# Patient Record
Sex: FEMALE | Race: WHITE | NOT HISPANIC OR LATINO | Employment: FULL TIME
[De-identification: names, ages, dates, MRNs, and addresses within clinical notes are randomized per-mention and may not be internally consistent; named-entity substitution may affect disease eponyms.]

---

## 2017-07-22 ENCOUNTER — HEALTH MAINTENANCE LETTER (OUTPATIENT)
Age: 42
End: 2017-07-22

## 2017-10-06 ENCOUNTER — HOSPITAL ENCOUNTER (OUTPATIENT)
Dept: MAMMOGRAPHY | Facility: CLINIC | Age: 42
Discharge: HOME | End: 2017-10-06
Attending: INTERNAL MEDICINE | Admitting: INTERNAL MEDICINE
Payer: COMMERCIAL

## 2017-10-06 DIAGNOSIS — Z12.31 VISIT FOR SCREENING MAMMOGRAM: ICD-10-CM

## 2017-10-06 PROCEDURE — G0202 SCR MAMMO BI INCL CAD: HCPCS

## 2018-01-29 ENCOUNTER — OFFICE VISIT (OUTPATIENT)
Dept: SURGERY | Facility: CLINIC | Age: 43
End: 2018-01-29
Payer: COMMERCIAL

## 2018-01-29 VITALS
WEIGHT: 180 LBS | OXYGEN SATURATION: 98 % | HEART RATE: 71 BPM | RESPIRATION RATE: 16 BRPM | HEIGHT: 70 IN | BODY MASS INDEX: 25.77 KG/M2 | DIASTOLIC BLOOD PRESSURE: 62 MMHG | SYSTOLIC BLOOD PRESSURE: 118 MMHG

## 2018-01-29 DIAGNOSIS — K42.9 UMBILICAL HERNIA WITHOUT OBSTRUCTION AND WITHOUT GANGRENE: Primary | ICD-10-CM

## 2018-01-29 PROCEDURE — 99203 OFFICE O/P NEW LOW 30 MIN: CPT | Performed by: SURGERY

## 2018-01-29 ASSESSMENT — ENCOUNTER SYMPTOMS
VOMITING: 1
DIARRHEA: 1

## 2018-01-29 NOTE — MR AVS SNAPSHOT
After Visit Summary   1/29/2018    Laney Hall    MRN: [id number]           Patient Information     Date Of Birth          1975        Visit Information        Provider Department      1/29/2018 9:15 AM Trent Perez MD [facility] Hernia      Today's Diagnoses     Umbilical hernia without obstruction and without gangrene    -  1       Follow-ups after your visit        Your next 10 appointments already scheduled     Feb 09, 2018   Procedure with Trent Perez MD   [facility] (--)    [address]  [facility] [address]   [phone]            Feb 09, 2018 10:45 AM [facility] Same Day Surgery with Trent Perez MD, Morelia Edgar PA-C   [facility] ([facility])    [facility] ([facility])   [phone]              Who to contact     If you have questions or need follow up information about today's clinic visit or your schedule please contact [facility] [place] directly at [phone].  Normal or non-critical lab and imaging results will be communicated to you by [employer], letter or phone within 4 business days after the clinic has received the results. If you do not hear from us within 7 days, please contact the clinic through [employer] or phone. If you have a critical or abnormal lab result, we will notify you by phone as soon as possible.  Submit refill requests through [employer] or call your pharmacy and they will forward the refill request to us. Please allow 3 business days for your refill to be completed.          Additional Information About Your Visit        [employer]igoVision Information     [employer] gives you secure access to your electronic health record. If you see a primary care provider, you can also send messages to your care team and make appointments. If you have  "questions, please call your primary care clinic.  If you do not have a primary care provider, please call [phone] and they will assist you.        Care EveryWhere ID     This is your Care EveryWhere ID. This could be used by other organizations to access your [facility] medical records  [id number]        Your Vitals Were     Pulse Respirations Height Pulse Oximetry BMI (Body Mass Index)       71 16 5' 10\" (1.778 m) 98% 25.83 kg/m2        Blood Pressure from Last 3 Encounters:   01/29/18 118/62   01/09/16 122/76   07/15/13 96/70    Weight from Last 3 Encounters:   01/29/18 180 lb (81.6 kg)   01/07/16 205 lb (93 kg)   07/15/13 185 lb 12.8 oz (84.3 kg)              Today, you had the following     No orders found for display       Primary Care Provider Office Phone # Fax #    Orin Cabrera [phone]       [address] [facility] [address]        Equal Access to Services     [facility]: Hadii aad ku hadasho Soomaali, waaxda luqadaha, qaybta kaalmada adeegyada, waxzack oliver . So [facility] [phone].    ATENCIÓN: Si habla español, tiene a arnold disposición servicios gratuitos de asistencia lingüística. Llame al [phone].    We comply with applicable federal civil rights laws and [place] laws. We do not discriminate on the basis of race, color, national origin, age, disability, sex, sexual orientation, or gender identity.            Thank you!     Thank you for choosing [facility]  for your care. Our goal is always to provide you with excellent care. Hearing back from our patients is one way we can continue to improve our services. Please take a few minutes to complete the written survey that you may receive in the mail after your visit with us. Thank you!             Your Updated Medication List - Protect others around you: Learn how to safely use, store and throw away your medicines at www.disposemymeds.org.          This " list is accurate as of 1/29/18  9:59 AM.  Always use your most recent med list.                   Brand Name Dispense Instructions for use Diagnosis    PRENATAL VITAMINS PO      Take 1 tablet by mouth daily.

## 2018-01-29 NOTE — PROGRESS NOTES
HPI      ROS (Review of Systems):     GASTROINTESTINAL: Positive for vomiting and diarrhea.          Physical Exam

## 2018-01-29 NOTE — LETTER
"2018    Re: Laney Hall - 1975    Laney is a 42 year old female who presents for hernia evaluation.  The patient has noticed a bulge.  Pain and a vague discomfort has been present.  Symptoms began 2 years ago after the birth of her youngest child. More recently she has had bloating, nausea and vomiting and diarrhea every couple of weeks which she has attributed to the hernia.  The patient has had previous abdominal surgery including a laparoscopic procedure to change the course of her right ureter.  Patient does not report that increased activity/lifting causes pain. 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     Constipation No  Dysuria No  Cough No  Smoking No   Pt's chart has been reviewed for PMH, PSH, allergies, medications, social and family history.     ROS:  Pulm:  No shortness of breath, dyspnea on exertion, cough, or hemoptysis  CV:  negative  ABD:  See chief complaint  :  Negative  All other systems on 10 point review are negative.   /62  Pulse 71  Resp 16  Ht 5' 10\" (1.778 m)  Wt 180 lb (81.6 kg)  SpO2 98%  BMI 25.83 kg/m2  Physical exam:  Patient able to get up on table without difficulty.  abdomen is soft without significant tenderness, masses, organomegaly or guarding  bowel sounds are positive and no caput medusa noted.  There is visible eversion to the upper portion of her umbilical skin which is a partly reducible and fat containing hernia, it augments with cough and Valsalva. There are three small and well healed laparoscopic port sites, one of which is a little superior to the umbilicus.     Imaging  None     Assesment: umbilical hernia.     Plan: The nature of hernias, anatomic considerations, indications and approaches to repair were discussed.  Risks of operative intervention were discussed including infection, bleeding, harm to structures as well as recurrence, which is about 5% per decade of life.  Post operative recuperation and activity " limitations were reviewed as well. The patient is interested in pursuing repair and we will assist in scheduling this with her. I relayed that some of her intestinal symptoms might not improve after surgery as they do not seem physiologically related.  If they do persist, I would have her see a gastroenterologist.     Trent Gaytan MD

## 2018-01-29 NOTE — PROGRESS NOTES
"Laney is a 42 year old female who presents for hernia evaluation.  The patient has noticed a bulge.  Pain and a vague discomfort has been present.  Symptoms began 2 years ago after the birth of her youngest child. More recently she has had bloating, nausea and vomiting and diarrhea every couple of weeks which she has attributed to the hernia.  The patient has had previous abdominal surgery including a laparoscopic procedure to change the course of her right ureter.  Patient does not report that increased activity/lifting causes pain. 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    Constipation No  Dysuria No  Cough No  Smoking No    Pt's chart has been reviewed for PMH, PSH, allergies, medications, social and family history.    ROS:  Pulm:  No shortness of breath, dyspnea on exertion, cough, or hemoptysis  CV:  negative  ABD:  See chief complaint  :  Negative  All other systems on 10 point review are negative.    /62  Pulse 71  Resp 16  Ht 5' 10\" (1.778 m)  Wt 180 lb (81.6 kg)  SpO2 98%  BMI 25.83 kg/m2  Physical exam:  Patient able to get up on table without difficulty.  abdomen is soft without significant tenderness, masses, organomegaly or guarding  bowel sounds are positive and no caput medusa noted.  There is visible eversion to the upper portion of her umbilical skin which is a partly reducible and fat containing hernia, it augments with cough and Valsalva. There are three small and well healed laparoscopic port sites, one of which is a little superior to the umbilicus.    Imaging  None    Assesment: umbilical hernia.    Plan: The nature of hernias, anatomic considerations, indications and approaches to repair were discussed.  Risks of operative intervention were discussed including infection, bleeding, harm to structures as well as recurrence, which is about 5% per decade of life.  Post operative recuperation and activity limitations were reviewed as well.  The patient is interested " in pursuing repair and we will assist in scheduling this with her. I relayed that some of her intestinal symptoms might not improve after surgery as they do not seem physiologically related.  If they do persist, I would have her see a gastroenterologist.    Trent Gaytan MD  1/29/2018 9:36 AM    Please route or send letter to:  Primary Care Provider (PCP)

## 2018-02-08 ENCOUNTER — OFFICE VISIT (OUTPATIENT)
Dept: INTERNAL MEDICINE | Facility: CLINIC | Age: 43
End: 2018-02-08
Payer: COMMERCIAL

## 2018-02-08 VITALS
BODY MASS INDEX: 27.67 KG/M2 | SYSTOLIC BLOOD PRESSURE: 100 MMHG | WEIGHT: 186.8 LBS | DIASTOLIC BLOOD PRESSURE: 60 MMHG | TEMPERATURE: 97.8 F | HEART RATE: 72 BPM | HEIGHT: 69 IN | OXYGEN SATURATION: 98 %

## 2018-02-08 DIAGNOSIS — K42.9 UMBILICAL HERNIA WITHOUT OBSTRUCTION AND WITHOUT GANGRENE: ICD-10-CM

## 2018-02-08 DIAGNOSIS — T78.40XS ALLERGIC REACTION, SEQUELA: ICD-10-CM

## 2018-02-08 DIAGNOSIS — Z01.818 PREOP GENERAL PHYSICAL EXAM: Primary | ICD-10-CM

## 2018-02-08 LAB
ALBUMIN SERPL-MCNC: 3.6 G/DL (ref 3.4–5)
ALP SERPL-CCNC: 61 U/L (ref 40–150)
ALT SERPL W P-5'-P-CCNC: 22 U/L (ref 0–50)
ANION GAP SERPL CALCULATED.3IONS-SCNC: 3 MMOL/L (ref 3–14)
AST SERPL W P-5'-P-CCNC: 13 U/L (ref 0–45)
BILIRUB SERPL-MCNC: 0.2 MG/DL (ref 0.2–1.3)
BUN SERPL-MCNC: 13 MG/DL (ref 7–30)
CALCIUM SERPL-MCNC: 8.5 MG/DL (ref 8.5–10.1)
CHLORIDE SERPL-SCNC: 108 MMOL/L (ref 94–109)
CO2 SERPL-SCNC: 30 MMOL/L (ref 20–32)
CREAT SERPL-MCNC: 0.86 MG/DL (ref 0.52–1.04)
ERYTHROCYTE [DISTWIDTH] IN BLOOD BY AUTOMATED COUNT: 13.1 % (ref 10–15)
GFR SERPL CREATININE-BSD FRML MDRD: 73 ML/MIN/1.7M2
GLUCOSE SERPL-MCNC: 83 MG/DL (ref 70–99)
HCT VFR BLD AUTO: 41.9 % (ref 35–47)
HGB BLD-MCNC: 13.8 G/DL (ref 11.7–15.7)
MCH RBC QN AUTO: 30.2 PG (ref 26.5–33)
MCHC RBC AUTO-ENTMCNC: 32.9 G/DL (ref 31.5–36.5)
MCV RBC AUTO: 92 FL (ref 78–100)
PLATELET # BLD AUTO: 230 10E9/L (ref 150–450)
POTASSIUM SERPL-SCNC: 3.7 MMOL/L (ref 3.4–5.3)
PROT SERPL-MCNC: 6.9 G/DL (ref 6.8–8.8)
RBC # BLD AUTO: 4.57 10E12/L (ref 3.8–5.2)
SODIUM SERPL-SCNC: 141 MMOL/L (ref 133–144)
WBC # BLD AUTO: 9.5 10E9/L (ref 4–11)

## 2018-02-08 PROCEDURE — 80053 COMPREHEN METABOLIC PANEL: CPT | Performed by: INTERNAL MEDICINE

## 2018-02-08 PROCEDURE — 36415 COLL VENOUS BLD VENIPUNCTURE: CPT | Performed by: INTERNAL MEDICINE

## 2018-02-08 PROCEDURE — 85027 COMPLETE CBC AUTOMATED: CPT | Performed by: INTERNAL MEDICINE

## 2018-02-08 PROCEDURE — 99215 OFFICE O/P EST HI 40 MIN: CPT | Performed by: INTERNAL MEDICINE

## 2018-02-08 NOTE — NURSING NOTE
"Chief Complaint   Patient presents with     Pre-Op Exam      Hernia Surgery       Initial /60 (BP Location: Right arm, Patient Position: Sitting, Cuff Size: Adult Regular)  Pulse 72  Temp 97.8  F (36.6  C) (Oral)  Ht 5' 9\" (1.753 m)  Wt 186 lb 12.8 oz (84.7 kg)  LMP 01/15/2018 (Exact Date)  SpO2 98%  Breastfeeding? No  BMI 27.59 kg/m2 Estimated body mass index is 27.59 kg/(m^2) as calculated from the following:    Height as of this encounter: 5' 9\" (1.753 m).    Weight as of this encounter: 186 lb 12.8 oz (84.7 kg).  Medication Reconciliation: complete   IA SMA    "

## 2018-02-08 NOTE — H&P (VIEW-ONLY)
[place] [address]  [facility] [address]  [phone]  Dept: [phone]    PRE-OP EVALUATION:  Today's date: 2018    Laney Hall (: 1975) presents for pre-operative evaluation assessment as requested by Dr. Trent Hathaway. She requires evaluation and anesthesia risk assessment prior to undergoing surgery/procedure for treatment of  Hernia.  Proposed procedure: Surgery    Date of Surgery/ Procedure 2018  Time of Surgery/ Procedure: 10:55am  Hospital/Surgical Facility: [facility]  Primary Physician: Orin Cabrera  Type of Anesthesia Anticipated: to be determined    Patient has a Health Care Directive or Living Will:  NO    Preop Questions 2018   1.  Do you have a history of heart attack, stroke, stent, bypass or surgery on an artery in the head, neck, heart or legs? No   2.  Do you ever have any pain or discomfort in your chest? No   3.  Do you have a history of  Heart Failure? No   4.   Are you troubled by shortness of breath when:  walking on a level surface, or up a slight hill, or at night? No   5.  Do you currently have a cold, bronchitis or other respiratory infection? No   6.  Do you have a cough, shortness of breath, or wheezing? No   7.  Do you sometimes get pains in the calves of your legs when you walk? No   8. Do you or anyone in your family have previous history of blood clots? No   9.  Do you or does anyone in your family have a serious bleeding problem such as prolonged bleeding following surgeries or cuts? No   10. Have you ever had problems with anemia or been told to take iron pills? No   11. Have you had any abnormal blood loss such as black, tarry or bloody stools, or abnormal vaginal bleeding? No   12. Have you ever had a blood transfusion? No   13. Have you or any of your relatives ever had problems with anesthesia? No   14. Do you have sleep apnea, excessive snoring or daytime drowsiness? No   15. Do you have any  "prosthetic heart valves? No   16. Do you have prosthetic joints? No   17. Is there any chance that you may be pregnant? No       CC:  Preop for multiple medical problems.    HPI:    The patient is scheduled for umbilical hernia surgery with Dr. Carter  on  Feb 9, 2018  No other acute complaints.    Assessment:  1. V72.83H Preop general physical exam _ I do not see any major contraindications for the patient to go through the scheduled surgery.    The proposed surgical procedure is considered  INTERMEDIATE,   surgery risk.    For above listed surgery and anesthesia, Patient is at  LOW  risk for surgery/procedure and perioperative/procedure complications.      (T78.40XS) Allergic reaction, to anesthesia med in 2005   ( surgery at [facility])  Comment: with hives   Plan: she thinks she might have the name at home. I advised her to come earlier, so the anesthesiologist will have time to contact [facility]     (K42.9) Umbilical hernia without obstruction and without gangrene  Comment:   Plan: surgery, as above        Cardiovascular risk  Assessment  -- low  --  No further cardiac work up is needed before this surgery.         Pulmonary Risk Assessment -- low  --  The patient doesn't have chronic lung disease nor acute respiratory problems         Anemia Assessment :  --  no anemia - patient doesn't need further anemia work up      Blood Sugar Assessment  --  patient doesn't have DM      Anticoagulation assessment  --  patient does not take anticoagulation meds        Plan:  1. In the morning of the surgery day you do not take any meds  2. Try to be at the surgical center at 7-7:30 to discuss with the anesthesiologist about the allergic reaction to the prior anesthesia medication  3. Labs today     Physical exam:    Blood pressure 100/60, pulse 72, temperature 97.8  F (36.6  C), temperature source Oral, height 5' 9\" (1.753 m), weight 186 lb 12.8 oz (84.7 kg), last menstrual period 01/15/2018, SpO2 98 %, not currently " breastfeeding.   NAD, appears comfortable  Skin clear, no rashes  HEENT: PERRLA, EOMI, anicteric sclera, pink conjunctiva, external ears appear normal, bilateral tympanic membranes clinically normal, oropharynx normal color.  Neck: supple, no JVD,  no thyroidmegaly  Lymph nodes non palpable in the cervical, supraclavicular   Chest: clear to auscultation with good respiratory effort  Cardiac: S1S2, RRR, no mgr appreciated  Abdomen: soft, not tender, not distended, audible bowel sound, no hepatosplenomegaly, no palpable masses, no abdominal bruits. Umbilical hernia   Extremities: no cyanosis, clubbing or edema.   Neuro: A, Ox3, no focal signs.        ROS:   as above     Patient Active Problem List   Diagnosis     SROM (spontaneous rupture of membranes)     Indication for care/intervention related to labor/delivery, antepartum     Normal delivery     Indication for care in labor or delivery     Delivery normal        Past Medical History:   Diagnosis Date     Renal disease     stent in Ureter 05 for torsion      Past Surgical History:   Procedure Laterality Date     AS ENLARGE BREAST WITH IMPLANT  2003     C NONSPECIFIC PROCEDURE  2003    breast augmentation     GENITOURINARY SURGERY      ureteral stent 05        PSHx: No complications with prior surgeries  She had allergic reaction to anesthesia med 2005    Soc Hx: No daily alcohol, no smoking     Family History   Problem Relation Age of Onset     Hypertension Maternal Grandmother      Arthritis Maternal Grandmother      RA     Cancer - colorectal Maternal Grandfather         All: reviewed    Meds: reviewed  Current Outpatient Prescriptions   Medication Sig Dispense Refill     PRENATAL VITAMINS PO Take 1 tablet by mouth daily.              Orin Bauer MD  Internal Medicine     MEDICAL HISTORY:                                                      Patient Active Problem List    Diagnosis Date Noted     Indication for care in labor or delivery 01/07/2016     Priority:  Medium     Delivery normal 01/07/2016     Priority: Medium     Normal delivery 05/03/2013     Priority: Medium     SROM (spontaneous rupture of membranes) 05/02/2013     Priority: Medium     Indication for care/intervention related to labor/delivery, antepartum 05/02/2013     Priority: Medium      Past Medical History:   Diagnosis Date     Renal disease     stent in Ureter 05 for torsion     Past Surgical History:   Procedure Laterality Date     AS ENLARGE BREAST WITH IMPLANT  2003     C NONSPECIFIC PROCEDURE  2003    breast augmentation     GENITOURINARY SURGERY      ureteral stent 05     Current Outpatient Prescriptions   Medication Sig Dispense Refill     PRENATAL VITAMINS PO Take 1 tablet by mouth daily.       OTC products: None, except as noted above    Allergies   Allergen Reactions     No Known Drug Allergies       Latex Allergy: NO    Social History   Substance Use Topics     Smoking status: Never Smoker     Smokeless tobacco: Never Used      Comment: NEVER SMOKES ON REGULAR BASIS     Alcohol use No      Comment: RARELY     History   Drug Use No         Recent Labs   Lab Test  01/08/16   0658  07/15/13   1549   HGB  13.2  14.5   PLT   --   202   NA   --   143   POTASSIUM   --   3.7   CR   --   0.81

## 2018-02-08 NOTE — PROGRESS NOTES
[place] [address]  [facility] [address]  [phone]  Dept: [phone]    PRE-OP EVALUATION:  Today's date: 2018    Laney Hall (: 1975) presents for pre-operative evaluation assessment as requested by Dr. Trent Hathaway. She requires evaluation and anesthesia risk assessment prior to undergoing surgery/procedure for treatment of  Hernia.  Proposed procedure: Surgery    Date of Surgery/ Procedure 2018  Time of Surgery/ Procedure: 10:55am  Hospital/Surgical Facility: [facility]  Primary Physician: Orin Cabrera  Type of Anesthesia Anticipated: to be determined    Patient has a Health Care Directive or Living Will:  NO    Preop Questions 2018   1.  Do you have a history of heart attack, stroke, stent, bypass or surgery on an artery in the head, neck, heart or legs? No   2.  Do you ever have any pain or discomfort in your chest? No   3.  Do you have a history of  Heart Failure? No   4.   Are you troubled by shortness of breath when:  walking on a level surface, or up a slight hill, or at night? No   5.  Do you currently have a cold, bronchitis or other respiratory infection? No   6.  Do you have a cough, shortness of breath, or wheezing? No   7.  Do you sometimes get pains in the calves of your legs when you walk? No   8. Do you or anyone in your family have previous history of blood clots? No   9.  Do you or does anyone in your family have a serious bleeding problem such as prolonged bleeding following surgeries or cuts? No   10. Have you ever had problems with anemia or been told to take iron pills? No   11. Have you had any abnormal blood loss such as black, tarry or bloody stools, or abnormal vaginal bleeding? No   12. Have you ever had a blood transfusion? No   13. Have you or any of your relatives ever had problems with anesthesia? No   14. Do you have sleep apnea, excessive snoring or daytime drowsiness? No   15. Do you have any  "prosthetic heart valves? No   16. Do you have prosthetic joints? No   17. Is there any chance that you may be pregnant? No       CC:  Preop for multiple medical problems.    HPI:    The patient is scheduled for umbilical hernia surgery with Dr. Carter  on  Feb 9, 2018  No other acute complaints.    Assessment:  1. V72.83H Preop general physical exam _ I do not see any major contraindications for the patient to go through the scheduled surgery.    The proposed surgical procedure is considered  INTERMEDIATE,   surgery risk.    For above listed surgery and anesthesia, Patient is at  LOW  risk for surgery/procedure and perioperative/procedure complications.      (T78.40XS) Allergic reaction, to anesthesia med in 2005   ( surgery at [facility])  Comment: with hives   Plan: she thinks she might have the name at home. I advised her to come earlier, so the anesthesiologist will have time to contact [facility]     (K42.9) Umbilical hernia without obstruction and without gangrene  Comment:   Plan: surgery, as above        Cardiovascular risk  Assessment  -- low  --  No further cardiac work up is needed before this surgery.         Pulmonary Risk Assessment -- low  --  The patient doesn't have chronic lung disease nor acute respiratory problems         Anemia Assessment :  --  no anemia - patient doesn't need further anemia work up      Blood Sugar Assessment  --  patient doesn't have DM      Anticoagulation assessment  --  patient does not take anticoagulation meds        Plan:  1. In the morning of the surgery day you do not take any meds  2. Try to be at the surgical center at 7-7:30 to discuss with the anesthesiologist about the allergic reaction to the prior anesthesia medication  3. Labs today     Physical exam:    Blood pressure 100/60, pulse 72, temperature 97.8  F (36.6  C), temperature source Oral, height 5' 9\" (1.753 m), weight 186 lb 12.8 oz (84.7 kg), last menstrual period 01/15/2018, SpO2 98 %, not currently " breastfeeding.   NAD, appears comfortable  Skin clear, no rashes  HEENT: PERRLA, EOMI, anicteric sclera, pink conjunctiva, external ears appear normal, bilateral tympanic membranes clinically normal, oropharynx normal color.  Neck: supple, no JVD,  no thyroidmegaly  Lymph nodes non palpable in the cervical, supraclavicular   Chest: clear to auscultation with good respiratory effort  Cardiac: S1S2, RRR, no mgr appreciated  Abdomen: soft, not tender, not distended, audible bowel sound, no hepatosplenomegaly, no palpable masses, no abdominal bruits. Umbilical hernia   Extremities: no cyanosis, clubbing or edema.   Neuro: A, Ox3, no focal signs.        ROS:   as above     Patient Active Problem List   Diagnosis     SROM (spontaneous rupture of membranes)     Indication for care/intervention related to labor/delivery, antepartum     Normal delivery     Indication for care in labor or delivery     Delivery normal        Past Medical History:   Diagnosis Date     Renal disease     stent in Ureter 05 for torsion      Past Surgical History:   Procedure Laterality Date     AS ENLARGE BREAST WITH IMPLANT  2003     C NONSPECIFIC PROCEDURE  2003    breast augmentation     GENITOURINARY SURGERY      ureteral stent 05        PSHx: No complications with prior surgeries  She had allergic reaction to anesthesia med 2005    Soc Hx: No daily alcohol, no smoking     Family History   Problem Relation Age of Onset     Hypertension Maternal Grandmother      Arthritis Maternal Grandmother      RA     Cancer - colorectal Maternal Grandfather         All: reviewed    Meds: reviewed  Current Outpatient Prescriptions   Medication Sig Dispense Refill     PRENATAL VITAMINS PO Take 1 tablet by mouth daily.              Orin Bauer MD  Internal Medicine     MEDICAL HISTORY:                                                      Patient Active Problem List    Diagnosis Date Noted     Indication for care in labor or delivery 01/07/2016     Priority:  Medium     Delivery normal 01/07/2016     Priority: Medium     Normal delivery 05/03/2013     Priority: Medium     SROM (spontaneous rupture of membranes) 05/02/2013     Priority: Medium     Indication for care/intervention related to labor/delivery, antepartum 05/02/2013     Priority: Medium      Past Medical History:   Diagnosis Date     Renal disease     stent in Ureter 05 for torsion     Past Surgical History:   Procedure Laterality Date     AS ENLARGE BREAST WITH IMPLANT  2003     C NONSPECIFIC PROCEDURE  2003    breast augmentation     GENITOURINARY SURGERY      ureteral stent 05     Current Outpatient Prescriptions   Medication Sig Dispense Refill     PRENATAL VITAMINS PO Take 1 tablet by mouth daily.       OTC products: None, except as noted above    Allergies   Allergen Reactions     No Known Drug Allergies       Latex Allergy: NO    Social History   Substance Use Topics     Smoking status: Never Smoker     Smokeless tobacco: Never Used      Comment: NEVER SMOKES ON REGULAR BASIS     Alcohol use No      Comment: RARELY     History   Drug Use No         Recent Labs   Lab Test  01/08/16   0658  07/15/13   1549   HGB  13.2  14.5   PLT   --   202   NA   --   143   POTASSIUM   --   3.7   CR   --   0.81

## 2018-02-08 NOTE — PATIENT INSTRUCTIONS
Plan:  1. In the morning of the surgery day you do not take any meds  2. Try to be at the surgical center at 7-7:30 to discuss with the anesthesiologist about the allergic reaction to the prior anesthesia medication  3. Labs today

## 2018-02-08 NOTE — MR AVS SNAPSHOT
After Visit Summary   2/8/2018    Laney Hall    MRN: [id number]           Patient Information     Date Of Birth          1975        Visit Information        Provider Department      2/8/2018 3:20 PM Orin Cabrera MD [facility]        Today's Diagnoses     Preop general physical exam    -  1    Allergic reaction, sequela        Umbilical hernia without obstruction and without gangrene          Care Instructions      Plan:  1. In the morning of the surgery day you do not take any meds  2. Try to be at the surgical center at 7-7:30 to discuss with the anesthesiologist about the allergic reaction to the prior anesthesia medication  3. Labs today           Follow-ups after your visit        Your next 10 appointments already scheduled     Feb 09, 2018   Procedure with Trent Perez MD   [facility] [place] [facility] (--)    [address] [facility] [address]   [phone]            Feb 09, 2018 10:45 AM [facility] Same Day Surgery with Trent Perez MD, Morelia Edgar PA-C   [facility] ([facility]    [facility])   [phone]              Who to contact     If you have questions or need follow up information about today's clinic visit or your schedule please contact [facility] directly at [phone].  Normal or non-critical lab and imaging results will be communicated to you by MyChart, letter or phone within 4 business days after the clinic has received the results. If you do not hear from us within 7 days, please contact the clinic through MyChart or phone. If you have a critical or abnormal lab result, we will notify you by phone as soon as possible.  Submit refill requests through [employer] or call your pharmacy and they will forward the refill request to us. Please allow 3 business days for  "your refill to be completed.          Additional Information About Your Visit        MyChart Information     [employer] gives you secure access to your electronic health record. If you see a primary care provider, you can also send messages to your care team and make appointments. If you have questions, please call your primary care clinic.  If you do not have a primary care provider, please call [phone] and they will assist you.        Care EveryWhere ID     This is your Care EveryWhere ID. This could be used by other organizations to access your [facility] medical records  [id number]        Your Vitals Were     Pulse Temperature Height Last Period Pulse Oximetry Breastfeeding?    72 97.8  F (36.6  C) (Oral) 5' 9\" (1.753 m) 01/15/2018 (Exact Date) 98% No    BMI (Body Mass Index)                   27.59 kg/m2            Blood Pressure from Last 3 Encounters:   02/08/18 100/60   01/29/18 118/62   01/09/16 122/76    Weight from Last 3 Encounters:   02/08/18 186 lb 12.8 oz (84.7 kg)   01/29/18 180 lb (81.6 kg)   02/05/18 180 lb (81.6 kg)              We Performed the Following     CBC with platelets     Comprehensive metabolic panel        Primary Care Provider Office Phone # Fax #    Orin Chasity Cabrera [phone]       [address] [facility] [address]        Equal Access to Services     [facility]: Hadii aad ku hadasho Soomaali, waaxda luqadaha, qaybta kaalmada adeegyada, brooks castillo hayyuri oliver . So [facility] [phone].    ATENCIÓN: Si habla español, tiene a arnold disposición servicios gratuitos de asistencia lingüística. Llame al [phone].    We comply with applicable federal civil rights laws and [place] laws. We do not discriminate on the basis of race, color, national origin, age, disability, sex, sexual orientation, or gender identity.            Thank you!     Thank you for choosing [facility]  for your care. Our goal is always to " provide you with excellent care. Hearing back from our patients is one way we can continue to improve our services. Please take a few minutes to complete the written survey that you may receive in the mail after your visit with us. Thank you!             Your Updated Medication List - Protect others around you: Learn how to safely use, store and throw away your medicines at www.disposemymeds.org.          This list is accurate as of 2/8/18  4:04 PM.  Always use your most recent med list.                   Brand Name Dispense Instructions for use Diagnosis    PRENATAL VITAMINS PO      Take 1 tablet by mouth daily.

## 2018-02-09 ENCOUNTER — HOSPITAL ENCOUNTER (OUTPATIENT)
Facility: CLINIC | Age: 43
Discharge: HOME | End: 2018-02-09
Attending: SURGERY | Admitting: SURGERY
Payer: COMMERCIAL

## 2018-02-09 ENCOUNTER — ANESTHESIA EVENT (OUTPATIENT)
Dept: SURGERY | Facility: CLINIC | Age: 43
End: 2018-02-09
Payer: COMMERCIAL

## 2018-02-09 ENCOUNTER — ANESTHESIA (OUTPATIENT)
Dept: SURGERY | Facility: CLINIC | Age: 43
End: 2018-02-09
Payer: COMMERCIAL

## 2018-02-09 ENCOUNTER — SURGERY (OUTPATIENT)
Age: 43
End: 2018-02-09

## 2018-02-09 ENCOUNTER — APPOINTMENT (OUTPATIENT)
Dept: SURGERY | Facility: PHYSICIAN GROUP | Age: 43
End: 2018-02-09
Payer: COMMERCIAL

## 2018-02-09 VITALS
SYSTOLIC BLOOD PRESSURE: 122 MMHG | BODY MASS INDEX: 27.55 KG/M2 | WEIGHT: 186 LBS | TEMPERATURE: 97.8 F | RESPIRATION RATE: 18 BRPM | DIASTOLIC BLOOD PRESSURE: 87 MMHG | HEART RATE: 68 BPM | OXYGEN SATURATION: 99 % | HEIGHT: 69 IN

## 2018-02-09 DIAGNOSIS — K42.9 UMBILICAL HERNIA WITHOUT OBSTRUCTION AND WITHOUT GANGRENE: Primary | ICD-10-CM

## 2018-02-09 LAB — HCG UR QL: NEGATIVE

## 2018-02-09 PROCEDURE — 25000128 H RX IP 250 OP 636: Performed by: ANESTHESIOLOGY

## 2018-02-09 PROCEDURE — 25000128 H RX IP 250 OP 636: Performed by: SURGERY

## 2018-02-09 PROCEDURE — C1781 MESH (IMPLANTABLE): HCPCS | Performed by: SURGERY

## 2018-02-09 PROCEDURE — 25000566 ZZH SEVOFLURANE, EA 15 MIN: Performed by: SURGERY

## 2018-02-09 PROCEDURE — 25000132 ZZH RX MED GY IP 250 OP 250 PS 637: Performed by: SURGERY

## 2018-02-09 PROCEDURE — 49585 ZZHC REPAIR UMBILICAL HERN,5+Y/O,REDUC: CPT | Performed by: SURGERY

## 2018-02-09 PROCEDURE — 49585 ZZHC REPAIR UMBILICAL HERN,5+Y/O,REDUC: CPT | Mod: AS | Performed by: PHYSICIAN ASSISTANT

## 2018-02-09 PROCEDURE — 27210794 ZZH OR GENERAL SUPPLY STERILE: Performed by: SURGERY

## 2018-02-09 PROCEDURE — 25000128 H RX IP 250 OP 636: Performed by: NURSE ANESTHETIST, CERTIFIED REGISTERED

## 2018-02-09 PROCEDURE — 36000052 ZZH SURGERY LEVEL 2 EA 15 ADDTL MIN: Performed by: SURGERY

## 2018-02-09 PROCEDURE — 71000027 ZZH RECOVERY PHASE 2 EACH 15 MINS: Performed by: SURGERY

## 2018-02-09 PROCEDURE — 40000305 ZZH STATISTIC PRE PROC ASSESS I: Performed by: SURGERY

## 2018-02-09 PROCEDURE — 37000009 ZZH ANESTHESIA TECHNICAL FEE, EACH ADDTL 15 MIN: Performed by: SURGERY

## 2018-02-09 PROCEDURE — 36000050 ZZH SURGERY LEVEL 2 1ST 30 MIN: Performed by: SURGERY

## 2018-02-09 PROCEDURE — 25000125 ZZHC RX 250: Performed by: SURGERY

## 2018-02-09 PROCEDURE — 37000008 ZZH ANESTHESIA TECHNICAL FEE, 1ST 30 MIN: Performed by: SURGERY

## 2018-02-09 PROCEDURE — 81025 URINE PREGNANCY TEST: CPT | Performed by: ANESTHESIOLOGY

## 2018-02-09 PROCEDURE — 71000012 ZZH RECOVERY PHASE 1 LEVEL 1 FIRST HR: Performed by: SURGERY

## 2018-02-09 PROCEDURE — 25000125 ZZHC RX 250: Performed by: NURSE ANESTHETIST, CERTIFIED REGISTERED

## 2018-02-09 DEVICE — MESH VENTRALEX HERNIA 2.5" CIRCLE MED W/STRAP 5950008: Type: IMPLANTABLE DEVICE | Site: UMBILICAL | Status: FUNCTIONAL

## 2018-02-09 RX ORDER — FENTANYL CITRATE 50 UG/ML
INJECTION, SOLUTION INTRAMUSCULAR; INTRAVENOUS PRN
Status: DISCONTINUED | OUTPATIENT
Start: 2018-02-09 | End: 2018-02-09

## 2018-02-09 RX ORDER — GLYCINE 1.5 G/100ML
SOLUTION IRRIGATION PRN
Status: DISCONTINUED | OUTPATIENT
Start: 2018-02-09 | End: 2018-02-09 | Stop reason: HOSPADM

## 2018-02-09 RX ORDER — PROPOFOL 10 MG/ML
INJECTION, EMULSION INTRAVENOUS PRN
Status: DISCONTINUED | OUTPATIENT
Start: 2018-02-09 | End: 2018-02-09

## 2018-02-09 RX ORDER — FENTANYL CITRATE 50 UG/ML
25-50 INJECTION, SOLUTION INTRAMUSCULAR; INTRAVENOUS
Status: DISCONTINUED | OUTPATIENT
Start: 2018-02-09 | End: 2018-02-09 | Stop reason: HOSPADM

## 2018-02-09 RX ORDER — GLYCOPYRROLATE 0.2 MG/ML
INJECTION, SOLUTION INTRAMUSCULAR; INTRAVENOUS PRN
Status: DISCONTINUED | OUTPATIENT
Start: 2018-02-09 | End: 2018-02-09

## 2018-02-09 RX ORDER — BUPIVACAINE HYDROCHLORIDE 5 MG/ML
INJECTION, SOLUTION EPIDURAL; INTRACAUDAL PRN
Status: DISCONTINUED | OUTPATIENT
Start: 2018-02-09 | End: 2018-02-09 | Stop reason: HOSPADM

## 2018-02-09 RX ORDER — ONDANSETRON 4 MG/1
4 TABLET, ORALLY DISINTEGRATING ORAL EVERY 30 MIN PRN
Status: DISCONTINUED | OUTPATIENT
Start: 2018-02-09 | End: 2018-02-09 | Stop reason: HOSPADM

## 2018-02-09 RX ORDER — CEFAZOLIN SODIUM 1 G/3ML
1 INJECTION, POWDER, FOR SOLUTION INTRAMUSCULAR; INTRAVENOUS SEE ADMIN INSTRUCTIONS
Status: DISCONTINUED | OUTPATIENT
Start: 2018-02-09 | End: 2018-02-09 | Stop reason: HOSPADM

## 2018-02-09 RX ORDER — HYDROCODONE BITARTRATE AND ACETAMINOPHEN 5; 325 MG/1; MG/1
1-2 TABLET ORAL EVERY 4 HOURS PRN
Qty: 15 TABLET | Refills: 0 | Status: SHIPPED | OUTPATIENT
Start: 2018-02-09 | End: 2019-04-12

## 2018-02-09 RX ORDER — CEFAZOLIN SODIUM 2 G/100ML
2 INJECTION, SOLUTION INTRAVENOUS
Status: COMPLETED | OUTPATIENT
Start: 2018-02-09 | End: 2018-02-09

## 2018-02-09 RX ORDER — HYDROMORPHONE HYDROCHLORIDE 1 MG/ML
.3-.5 INJECTION, SOLUTION INTRAMUSCULAR; INTRAVENOUS; SUBCUTANEOUS EVERY 10 MIN PRN
Status: DISCONTINUED | OUTPATIENT
Start: 2018-02-09 | End: 2018-02-09 | Stop reason: HOSPADM

## 2018-02-09 RX ORDER — MEPERIDINE HYDROCHLORIDE 25 MG/ML
12.5 INJECTION INTRAMUSCULAR; INTRAVENOUS; SUBCUTANEOUS
Status: DISCONTINUED | OUTPATIENT
Start: 2018-02-09 | End: 2018-02-09 | Stop reason: HOSPADM

## 2018-02-09 RX ORDER — LIDOCAINE 40 MG/G
CREAM TOPICAL
Status: DISCONTINUED | OUTPATIENT
Start: 2018-02-09 | End: 2018-02-09 | Stop reason: HOSPADM

## 2018-02-09 RX ORDER — LABETALOL HYDROCHLORIDE 5 MG/ML
10 INJECTION, SOLUTION INTRAVENOUS
Status: DISCONTINUED | OUTPATIENT
Start: 2018-02-09 | End: 2018-02-09 | Stop reason: HOSPADM

## 2018-02-09 RX ORDER — HYDROCODONE BITARTRATE AND ACETAMINOPHEN 5; 325 MG/1; MG/1
1 TABLET ORAL
Status: COMPLETED | OUTPATIENT
Start: 2018-02-09 | End: 2018-02-09

## 2018-02-09 RX ORDER — ONDANSETRON 2 MG/ML
4 INJECTION INTRAMUSCULAR; INTRAVENOUS EVERY 30 MIN PRN
Status: DISCONTINUED | OUTPATIENT
Start: 2018-02-09 | End: 2018-02-09 | Stop reason: HOSPADM

## 2018-02-09 RX ORDER — ONDANSETRON 2 MG/ML
INJECTION INTRAMUSCULAR; INTRAVENOUS PRN
Status: DISCONTINUED | OUTPATIENT
Start: 2018-02-09 | End: 2018-02-09

## 2018-02-09 RX ORDER — DEXAMETHASONE SODIUM PHOSPHATE 4 MG/ML
INJECTION, SOLUTION INTRA-ARTICULAR; INTRALESIONAL; INTRAMUSCULAR; INTRAVENOUS; SOFT TISSUE PRN
Status: DISCONTINUED | OUTPATIENT
Start: 2018-02-09 | End: 2018-02-09

## 2018-02-09 RX ORDER — SODIUM CHLORIDE, SODIUM LACTATE, POTASSIUM CHLORIDE, CALCIUM CHLORIDE 600; 310; 30; 20 MG/100ML; MG/100ML; MG/100ML; MG/100ML
INJECTION, SOLUTION INTRAVENOUS CONTINUOUS
Status: DISCONTINUED | OUTPATIENT
Start: 2018-02-09 | End: 2018-02-09 | Stop reason: HOSPADM

## 2018-02-09 RX ORDER — LIDOCAINE HYDROCHLORIDE 10 MG/ML
INJECTION, SOLUTION INFILTRATION; PERINEURAL PRN
Status: DISCONTINUED | OUTPATIENT
Start: 2018-02-09 | End: 2018-02-09

## 2018-02-09 RX ORDER — NALOXONE HYDROCHLORIDE 0.4 MG/ML
.1-.4 INJECTION, SOLUTION INTRAMUSCULAR; INTRAVENOUS; SUBCUTANEOUS
Status: DISCONTINUED | OUTPATIENT
Start: 2018-02-09 | End: 2018-02-09 | Stop reason: HOSPADM

## 2018-02-09 RX ADMIN — CEFAZOLIN SODIUM 2 G: 2 INJECTION, SOLUTION INTRAVENOUS at 11:49

## 2018-02-09 RX ADMIN — SODIUM CHLORIDE, POTASSIUM CHLORIDE, SODIUM LACTATE AND CALCIUM CHLORIDE: 600; 310; 30; 20 INJECTION, SOLUTION INTRAVENOUS at 10:50

## 2018-02-09 RX ADMIN — GLYCOPYRROLATE 0.2 MG: 0.2 INJECTION, SOLUTION INTRAMUSCULAR; INTRAVENOUS at 11:49

## 2018-02-09 RX ADMIN — GLYCINE 50 ML: 1.5 SOLUTION IRRIGATION at 12:15

## 2018-02-09 RX ADMIN — FENTANYL CITRATE 50 MCG: 50 INJECTION, SOLUTION INTRAMUSCULAR; INTRAVENOUS at 12:25

## 2018-02-09 RX ADMIN — FENTANYL CITRATE 100 MCG: 50 INJECTION, SOLUTION INTRAMUSCULAR; INTRAVENOUS at 11:43

## 2018-02-09 RX ADMIN — DEXAMETHASONE SODIUM PHOSPHATE 4 MG: 4 INJECTION, SOLUTION INTRA-ARTICULAR; INTRALESIONAL; INTRAMUSCULAR; INTRAVENOUS; SOFT TISSUE at 11:43

## 2018-02-09 RX ADMIN — FENTANYL CITRATE 50 MCG: 50 INJECTION, SOLUTION INTRAMUSCULAR; INTRAVENOUS at 12:01

## 2018-02-09 RX ADMIN — ONDANSETRON 4 MG: 2 INJECTION INTRAMUSCULAR; INTRAVENOUS at 12:00

## 2018-02-09 RX ADMIN — HYDROCODONE BITARTRATE AND ACETAMINOPHEN 1 TABLET: 5; 325 TABLET ORAL at 12:55

## 2018-02-09 RX ADMIN — MIDAZOLAM 2 MG: 1 INJECTION INTRAMUSCULAR; INTRAVENOUS at 11:37

## 2018-02-09 RX ADMIN — PROPOFOL 200 MG: 10 INJECTION, EMULSION INTRAVENOUS at 11:43

## 2018-02-09 RX ADMIN — BUPIVACAINE HYDROCHLORIDE 30 ML: 5 INJECTION, SOLUTION EPIDURAL; INTRACAUDAL at 12:14

## 2018-02-09 RX ADMIN — FENTANYL CITRATE 50 MCG: 50 INJECTION INTRAMUSCULAR; INTRAVENOUS at 12:49

## 2018-02-09 RX ADMIN — LIDOCAINE HYDROCHLORIDE 30 MG: 10 INJECTION, SOLUTION INFILTRATION; PERINEURAL at 11:43

## 2018-02-09 NOTE — ANESTHESIA POSTPROCEDURE EVALUATION
Patient: Laney Hall    Procedure(s):  Open repair umbilical hernia with mesh - Wound Class: I-Clean    Diagnosis:Umbilical hernia  Diagnosis Additional Information: Pre-operative diagnosis: Umbilical hernia  Post-operative diagnosis Umbilical hernia  Procedure: Procedure(s):  Open repair umbilical hernia with mesh - Wound Class: I-Clean        Anesthesia Type:  No value filed.    Note:  Anesthesia Post Evaluation    Patient location during evaluation: PACU  Patient participation: Able to fully participate in evaluation  Level of consciousness: awake and alert  Pain management: adequate  Airway patency: patent  Cardiovascular status: acceptable  Respiratory status: acceptable  Hydration status: acceptable  PONV: none     Anesthetic complications: None          Last vitals:  Vitals:    02/09/18 1300 02/09/18 1321 02/09/18 1349   BP: 122/74 121/85 122/87   Pulse:      Resp: 17 18 18   Temp: 97.5  F (36.4  C) 98.2  F (36.8  C) 97.8  F (36.6  C)   SpO2: 100% 100% 99%         Electronically Signed By: Juan A Contreras MD  February 9, 2018  4:17 PM

## 2018-02-09 NOTE — DISCHARGE INSTRUCTIONS
"HOME CARE FOLLOWING UMBILICAL/VENTRAL HERNIA REPAIR  BO Freeman, MARIAN Shell, JASBIR Hathaway    DIET:  No restrictions.  Increased fluid intake is recommended. While taking pain medications, increase dietary fiber or add a fiber supplementation like Metamucil or Citrucel to help prevent constipation - a possible side effect of pain medications.    NAUSEA:  If nauseated from the anesthetic/pain meds; rest in bed, get up cautiously with assistance, and drink clear liquids (juice, tea, broth).    ACTIVITY:  Light Activity -- you may immediately be up and about as tolerated.  Driving -- you may drive when comfortable and off narcotic pain medications.  Light Work -- resume when comfortable off pain medications.  (If you can drive, you probably can work.)  Strenuous Work/Activity -- limit lifting to 20 pounds for 3 weeks.  Active Sports (running, biking, etc.) -- cautiously resume after 4 weeks.    INCISIONAL CARE:    If you have a dressing in place, keep clean and dry for 48 hours after surgery.  After this timeframe, you may replace the gauze daily if it becomes soiled.    You may remove the dressing and shower 48 hours after surgery.  Do not submerse incision in water for 1 week.    If you have a Dermabond dressing (a type of skin glue), you may shower immediately.    Sutures will absorb and need not be removed.    If present, leave the steri-strips (white paper tapes) in place for 14 days after surgery.    If present, leave Dermabond glue in place until it wears/flakes off.    Expect a variable amount of swelling/bruising/discoloration that may appear around or below the repair site.    Some numbness around the incision is common.    A lump/\"healing ridge\" under the incision is normal and will gradually resolve over the following 1-2 months.    DISCOMFORT:  Local anesthetic placed at surgery should provide relief for 4-8 hours.  Begin taking pain pills before discomfort is severe.  Take the " pain medication with some food, when possible, to minimize side effects.  Intermittent use of ice packs to the hernia repair site may help during the first 1-3 weeks after surgery.  Expect gradual improvement.    Over-the-counter anti-inflammatory medications (i.e. Ibuprofen/Advil/Motrin or Naprosyn/Aleve) may be used per package instructions in addition to or while tapering off the narcotic pain medications to decrease swelling and sensitivity at the repair site.  DO NOT TAKE these Anti-inflammatory medications if your primary physician has advised against doing so, or if you have acid reflux, ulcer, or bleeding disorder, or take blood-thinner medications.  Call your primary physician or the surgery office if you have medication questions.      RETURN APPOINTMENT:  Schedule a follow-up visit 2-3 weeks post-op.  Office Phone:  [phone]     CONTACT US IF THE FOLLOWING DEVELOPS:   1. A fever that is above 101     2. If there is a large amount of drainage, bleeding, or swelling.   3. Severe pain that is not relieved by your prescription.   4. Drainage that is thick, cloudy, yellow, green or white.   5. Any other questions not answered by  Frequently Asked Questions  sheet.      FREQUENTLY ASKED QUESTIONS:    Q:  How should my incision look?    A:  Normally your incision will appear slightly swollen with light redness directly along the incision itself as it heals.  It may feel like a bump or ridge as the healing/scarring happens, and over time (3-4 months) this bump or ridge feeling should slowly go away.  In general, clear or pink watery drainage can be normal at first as your incision heals, but should decrease over time.    Q:  How do I know if my incision is infected?  A:  Look at your incision for signs of infection, like redness around the incision spreading to surrounding skin, or drainage of cloudy or foul-smelling drainage.  If you feel warm, check your temperature to see if you are running a fever.     **If any of these things occur, please notify the nurse at our office.  We may need you to come into the office for an incision check.      Q:  How do I take care of my incision?  A:  If you have a dressing in place - Starting the day after surgery, replace the dressing 1-2 times a day until there is no further drainage from the incision.  At that time, a dressing is no longer needed.  Try to minimize tape on the skin if irritation is occurring at the tape sites.  If you have significant irritation from tape on the skin, please call the office to discuss other method of dressing your incision.    Small pieces of tape called  steri-strips  may be present directly overlying your incision; these may be removed 10 days after surgery unless otherwise specified by your surgeon.  If these tapes start to loosen at the ends, you may trim them back until they fall off or are removed.    A:  If you had  Dermabond  tissue glue used as a dressing (this causes your incision to look shiny with a clear covering over it) - This type of dressing wears off with time and does not require more dressings over the top unless it is draining around the glue as it wears off.  Do not apply ointments or lotions over the incisions until the glue has completely worn off.    Q:  There is a piece of tape or a sticky  lead  still on my skin.  Can I remove this?  A:  Sometimes the sticky  leads  used for monitoring during surgery or for evaluation in the emergency department are not all removed while you are in the hospital.  These sometimes have a tab or metal dot on them.  You can easily remove these on your own, like taking off a band-aid.  If there is a gel substance under the  lead , simply wipe/clean it off with a washcloth or paper towel.      Q:  What can I do to minimize constipation (very hard stools, or lack of stools)?  A:  Stay well hydrated.  Increase your dietary fiber intake or take a fiber supplement -with plenty of water.  Walk  around frequently.  You may consider an over-the-counter stool-softener.  Your Pharmacist can assist you with choosing one that is stocked at your pharmacy.  Constipation is also one of the most common side effects of pain medication.  If you are using pain medication, be pro-active and try to PREVENT problems with constipation by taking the steps above BEFORE constipation becomes a problem.    Q:  What do I do if I need more pain medications?  A:  Call the office to receive refills.  Be aware that certain pain meds cannot be called into a pharmacy and actually require a paper prescription.  A change may be made in your pain med as you progress thru your recovery period or if you have side effects to certain meds.    --Pain meds are NOT refilled after 5pm on weekdays, and NOT AT ALL on the weekends, so please look ahead to prevent problems.      Q:  Why am I having a hard time sleeping now that I am at home?  A:  Many medications you receive while you are in the hospital can impact your sleep for a number of days after your surgery/hospitalization.  Decreased level of activity and naps during the day may also make sleeping at night difficult.  Try to minimize day-time naps, and get up frequently during the day to walk around your home during your recovery time.  Sleep aides may be of some help, but are not recommended for long-term use.      Q:  I am having some back discomfort.  What should I do?  A:  This may be related to certain positioning that was required for your surgery, extended periods of time in bed, or other changes in your overall activity level.  You may try ice, heat, acetaminophen, or ibuprofen to treat this temporarily.  Note that many pain medications have acetaminophen in them and would state this on the prescription bottle.  Be sure not to exceed the maximum of 4000mg per day of acetaminophen.     **If the pain you are having does not resolve, is severe, or is a flare of back pain you have had  on other occasions prior to surgery, please contact your primary physician for further recommendations or for an appointment to be examined at their office.    Q:  Why am I having headaches?  A:  Headaches can be caused by many things:  caffeine withdrawal, use of pain meds, dehydration, high blood pressure, lack of sleep, over-activity/exhaustion, flare-up of usual migraine headaches.  If you feel this is related to muscle tension (a band-like feeling around the head, or a pressure at the low-back of the head) you may try ice or heat to this area.  You may need to drink more fluids (try electrolyte drink like Gatorade), rest, or take your usual migraine medications.   **If your headaches do not resolve, worsen, are accompanied by other symptoms, or if your blood pressure is high, please call your primary physician for recommendation and/or examination.    Q:  I am unable to urinate.  What do I do?  A:  A small percentage of people can have difficulty urinating initially after surgery.  This includes being able to urinate only a very small amount at a time and feeling discomfort or pressure in the very low abdomen.  This is called  urinary retention , and is actually an urgent situation.  Proceed to your nearest Emergency department for evaluation (not an Urgent Care Center).  Sometimes the bladder does not work correctly after certain medications you receive during surgery, or related to certain procedures.  You may need to have a catheter placed until your bladder recovers.  When planning to go to an Emergency department, it may help to call the ER to let them know you are coming in for this problem after a surgery.  This may help you get in quicker to be evaluated.  **If you have symptoms of a urinary tract infection, please contact your primary physician for the proper evaluation and treatment.          If you have other questions, please call the office Monday thru Friday between 8am and 5pm to discuss with the  nurse or physician assistant.  #[phone]    There is a surgeon ON CALL on weekday evenings and over the weekend in case of urgent need only, and may be contacted at the same number.    If you are having an emergency, call 911 or proceed to your nearest emergency department.    GENERAL ANESTHESIA OR SEDATION ADULT DISCHARGE INSTRUCTIONS   SPECIAL PRECAUTIONS FOR 24 HOURS AFTER SURGERY    IT IS NOT UNUSUAL TO FEEL LIGHT-HEADED OR FAINT, UP TO 24 HOURS AFTER SURGERY OR WHILE TAKING PAIN MEDICATION.  IF YOU HAVE THESE SYMPTOMS; SIT FOR A FEW MINUTES BEFORE STANDING AND HAVE SOMEONE ASSIST YOU WHEN YOU GET UP TO WALK OR USE THE BATHROOM.    YOU SHOULD REST AND RELAX FOR THE NEXT 24 HOURS AND YOU MUST MAKE ARRANGEMENTS TO HAVE SOMEONE STAY WITH YOU FOR AT LEAST 24 HOURS AFTER YOUR DISCHARGE.  AVOID HAZARDOUS AND STRENUOUS ACTIVITIES.  DO NOT MAKE IMPORTANT DECISIONS FOR 24 HOURS.    DO NOT DRIVE ANY VEHICLE OR OPERATE MECHANICAL EQUIPMENT FOR 24 HOURS FOLLOWING THE END OF YOUR SURGERY.  EVEN THOUGH YOU MAY FEEL NORMAL, YOUR REACTIONS MAY BE AFFECTED BY THE MEDICATION YOU HAVE RECEIVED.    DO NOT DRINK ALCOHOLIC BEVERAGES FOR 24 HOURS FOLLOWING YOUR SURGERY.    DRINK CLEAR LIQUIDS (APPLE JUICE, GINGER ALE, 7-UP, BROTH, ETC.).  PROGRESS TO YOUR REGULAR DIET AS YOU FEEL ABLE.    YOU MAY HAVE A DRY MOUTH, A SORE THROAT, MUSCLES ACHES OR TROUBLE SLEEPING.  THESE SHOULD GO AWAY AFTER 24 HOURS.    CALL YOUR DOCTOR FOR ANY OF THE FOLLOWING:  SIGNS OF INFECTION (FEVER, GROWING TENDERNESS AT THE SURGERY SITE, A LARGE AMOUNT OF DRAINAGE OR BLEEDING, SEVERE PAIN, FOUL-SMELLING DRAINAGE, REDNESS OR SWELLING.    IT HAS BEEN OVER 8 TO 10 HOURS SINCE SURGERY AND YOU ARE STILL NOT ABLE TO URINATE (PASS WATER)    You had 1 NORCO tablet at 1:00 pm.

## 2018-02-09 NOTE — ANESTHESIA CARE TRANSFER NOTE
Patient: Laney Hall    Procedure(s):  Open repair umbilical hernia with mesh - Wound Class: I-Clean    Diagnosis: Umbilical hernia  Diagnosis Additional Information: No value filed.    Anesthesia Type:   No value filed.     Note:  Airway :Face Mask  Patient transferred to:PACU  Comments: Report and sign off to RN in PACU.  Good spontaneous resps with LMA and O2. Skin pink, vital signs stable.Handoff Report: Identifed the Patient, Identified the Reponsible Provider, Reviewed the pertinent medical history, Discussed the surgical course, Reviewed Intra-OP anesthesia mangement and issues during anesthesia, Set expectations for post-procedure period and Allowed opportunity for questions and acknowledgement of understanding      Vitals: (Last set prior to Anesthesia Care Transfer)    CRNA VITALS  2/9/2018 1200 - 2/9/2018 1237      2/9/2018             Pulse: 50    SpO2: 100 %    Resp Rate (observed): (!)  6                Electronically Signed By: FENG Velazquez CRNA  February 9, 2018  12:37 PM

## 2018-02-09 NOTE — IP AVS SNAPSHOT
MRN:[id number]                      After Visit Summary   2/9/2018    Laney Hall    MRN: [id number]           Thank you!     Thank you for choosing [facility] for your care. Our goal is always to provide you with excellent care. Hearing back from our patients is one way we can continue to improve our services. Please take a few minutes to complete the written survey that you may receive in the mail after you visit. If you would like to speak to someone directly about your visit please contact Patient Relations at [phone]. Thank you!          Patient Information     Date Of Birth          1975        About your hospital stay     You were admitted on:  February 9, 2018 You last received care in the:  [facility]    You were discharged on:  February 9, 2018       Who to Call     For medical emergencies, please call 911.  For non-urgent questions about your medical care, please call your primary care provider or clinic, [phone]  For questions related to your surgery, please call your surgery clinic        Attending Provider     Provider Specialty    Trent Perez MD General Surgery       Primary Care Provider Office Phone # Fax #    Orin Chasity Cabrera [phone]      Further instructions from your care team       HOME CARE FOLLOWING UMBILICAL/VENTRAL HERNIA REPAIR  BO Freeman N. Guttormson, D. Maurer, JASBIR Hathaway    DIET:  No restrictions.  Increased fluid intake is recommended. While taking pain medications, increase dietary fiber or add a fiber supplementation like Metamucil or Citrucel to help prevent constipation - a possible side effect of pain medications.    NAUSEA:  If nauseated from the anesthetic/pain meds; rest in bed, get up cautiously with assistance, and drink clear liquids (juice, tea, broth).    ACTIVITY:  Light Activity -- you may immediately be up and about as  "tolerated.  Driving -- you may drive when comfortable and off narcotic pain medications.  Light Work -- resume when comfortable off pain medications.  (If you can drive, you probably can work.)  Strenuous Work/Activity -- limit lifting to 20 pounds for 3 weeks.  Active Sports (running, biking, etc.) -- cautiously resume after 4 weeks.    INCISIONAL CARE:    If you have a dressing in place, keep clean and dry for 48 hours after surgery.  After this timeframe, you may replace the gauze daily if it becomes soiled.    You may remove the dressing and shower 48 hours after surgery.  Do not submerse incision in water for 1 week.    If you have a Dermabond dressing (a type of skin glue), you may shower immediately.    Sutures will absorb and need not be removed.    If present, leave the steri-strips (white paper tapes) in place for 14 days after surgery.    If present, leave Dermabond glue in place until it wears/flakes off.    Expect a variable amount of swelling/bruising/discoloration that may appear around or below the repair site.    Some numbness around the incision is common.    A lump/\"healing ridge\" under the incision is normal and will gradually resolve over the following 1-2 months.    DISCOMFORT:  Local anesthetic placed at surgery should provide relief for 4-8 hours.  Begin taking pain pills before discomfort is severe.  Take the pain medication with some food, when possible, to minimize side effects.  Intermittent use of ice packs to the hernia repair site may help during the first 1-3 weeks after surgery.  Expect gradual improvement.    Over-the-counter anti-inflammatory medications (i.e. Ibuprofen/Advil/Motrin or Naprosyn/Aleve) may be used per package instructions in addition to or while tapering off the narcotic pain medications to decrease swelling and sensitivity at the repair site.  DO NOT TAKE these Anti-inflammatory medications if your primary physician has advised against doing so, or if you have acid " reflux, ulcer, or bleeding disorder, or take blood-thinner medications.  Call your primary physician or the surgery office if you have medication questions.      RETURN APPOINTMENT:  Schedule a follow-up visit 2-3 weeks post-op.  Office Phone:  [phone]     CONTACT US IF THE FOLLOWING DEVELOPS:   1. A fever that is above 101     2. If there is a large amount of drainage, bleeding, or swelling.   3. Severe pain that is not relieved by your prescription.   4. Drainage that is thick, cloudy, yellow, green or white.   5. Any other questions not answered by  Frequently Asked Questions  sheet.      FREQUENTLY ASKED QUESTIONS:    Q:  How should my incision look?    A:  Normally your incision will appear slightly swollen with light redness directly along the incision itself as it heals.  It may feel like a bump or ridge as the healing/scarring happens, and over time (3-4 months) this bump or ridge feeling should slowly go away.  In general, clear or pink watery drainage can be normal at first as your incision heals, but should decrease over time.    Q:  How do I know if my incision is infected?  A:  Look at your incision for signs of infection, like redness around the incision spreading to surrounding skin, or drainage of cloudy or foul-smelling drainage.  If you feel warm, check your temperature to see if you are running a fever.    **If any of these things occur, please notify the nurse at our office.  We may need you to come into the office for an incision check.      Q:  How do I take care of my incision?  A:  If you have a dressing in place - Starting the day after surgery, replace the dressing 1-2 times a day until there is no further drainage from the incision.  At that time, a dressing is no longer needed.  Try to minimize tape on the skin if irritation is occurring at the tape sites.  If you have significant irritation from tape on the skin, please call the office to discuss other method of dressing your  incision.    Small pieces of tape called  steri-strips  may be present directly overlying your incision; these may be removed 10 days after surgery unless otherwise specified by your surgeon.  If these tapes start to loosen at the ends, you may trim them back until they fall off or are removed.    A:  If you had  Dermabond  tissue glue used as a dressing (this causes your incision to look shiny with a clear covering over it) - This type of dressing wears off with time and does not require more dressings over the top unless it is draining around the glue as it wears off.  Do not apply ointments or lotions over the incisions until the glue has completely worn off.    Q:  There is a piece of tape or a sticky  lead  still on my skin.  Can I remove this?  A:  Sometimes the sticky  leads  used for monitoring during surgery or for evaluation in the emergency department are not all removed while you are in the hospital.  These sometimes have a tab or metal dot on them.  You can easily remove these on your own, like taking off a band-aid.  If there is a gel substance under the  lead , simply wipe/clean it off with a washcloth or paper towel.      Q:  What can I do to minimize constipation (very hard stools, or lack of stools)?  A:  Stay well hydrated.  Increase your dietary fiber intake or take a fiber supplement -with plenty of water.  Walk around frequently.  You may consider an over-the-counter stool-softener.  Your Pharmacist can assist you with choosing one that is stocked at your pharmacy.  Constipation is also one of the most common side effects of pain medication.  If you are using pain medication, be pro-active and try to PREVENT problems with constipation by taking the steps above BEFORE constipation becomes a problem.    Q:  What do I do if I need more pain medications?  A:  Call the office to receive refills.  Be aware that certain pain meds cannot be called into a pharmacy and actually require a paper  prescription.  A change may be made in your pain med as you progress thru your recovery period or if you have side effects to certain meds.    --Pain meds are NOT refilled after 5pm on weekdays, and NOT AT ALL on the weekends, so please look ahead to prevent problems.      Q:  Why am I having a hard time sleeping now that I am at home?  A:  Many medications you receive while you are in the hospital can impact your sleep for a number of days after your surgery/hospitalization.  Decreased level of activity and naps during the day may also make sleeping at night difficult.  Try to minimize day-time naps, and get up frequently during the day to walk around your home during your recovery time.  Sleep aides may be of some help, but are not recommended for long-term use.      Q:  I am having some back discomfort.  What should I do?  A:  This may be related to certain positioning that was required for your surgery, extended periods of time in bed, or other changes in your overall activity level.  You may try ice, heat, acetaminophen, or ibuprofen to treat this temporarily.  Note that many pain medications have acetaminophen in them and would state this on the prescription bottle.  Be sure not to exceed the maximum of 4000mg per day of acetaminophen.     **If the pain you are having does not resolve, is severe, or is a flare of back pain you have had on other occasions prior to surgery, please contact your primary physician for further recommendations or for an appointment to be examined at their office.    Q:  Why am I having headaches?  A:  Headaches can be caused by many things:  caffeine withdrawal, use of pain meds, dehydration, high blood pressure, lack of sleep, over-activity/exhaustion, flare-up of usual migraine headaches.  If you feel this is related to muscle tension (a band-like feeling around the head, or a pressure at the low-back of the head) you may try ice or heat to this area.  You may need to drink more  fluids (try electrolyte drink like Gatorade), rest, or take your usual migraine medications.   **If your headaches do not resolve, worsen, are accompanied by other symptoms, or if your blood pressure is high, please call your primary physician for recommendation and/or examination.    Q:  I am unable to urinate.  What do I do?  A:  A small percentage of people can have difficulty urinating initially after surgery.  This includes being able to urinate only a very small amount at a time and feeling discomfort or pressure in the very low abdomen.  This is called  urinary retention , and is actually an urgent situation.  Proceed to your nearest Emergency department for evaluation (not an Urgent Care Center).  Sometimes the bladder does not work correctly after certain medications you receive during surgery, or related to certain procedures.  You may need to have a catheter placed until your bladder recovers.  When planning to go to an Emergency department, it may help to call the ER to let them know you are coming in for this problem after a surgery.  This may help you get in quicker to be evaluated.  **If you have symptoms of a urinary tract infection, please contact your primary physician for the proper evaluation and treatment.          If you have other questions, please call the office Monday thru Friday between 8am and 5pm to discuss with the nurse or physician assistant.  #[phone]    There is a surgeon ON CALL on weekday evenings and over the weekend in case of urgent need only, and may be contacted at the same number.    If you are having an emergency, call 911 or proceed to your nearest emergency department.    GENERAL ANESTHESIA OR SEDATION ADULT DISCHARGE INSTRUCTIONS   SPECIAL PRECAUTIONS FOR 24 HOURS AFTER SURGERY    IT IS NOT UNUSUAL TO FEEL LIGHT-HEADED OR FAINT, UP TO 24 HOURS AFTER SURGERY OR WHILE TAKING PAIN MEDICATION.  IF YOU HAVE THESE SYMPTOMS; SIT FOR A FEW MINUTES BEFORE STANDING AND HAVE  "SOMEONE ASSIST YOU WHEN YOU GET UP TO WALK OR USE THE BATHROOM.    YOU SHOULD REST AND RELAX FOR THE NEXT 24 HOURS AND YOU MUST MAKE ARRANGEMENTS TO HAVE SOMEONE STAY WITH YOU FOR AT LEAST 24 HOURS AFTER YOUR DISCHARGE.  AVOID HAZARDOUS AND STRENUOUS ACTIVITIES.  DO NOT MAKE IMPORTANT DECISIONS FOR 24 HOURS.    DO NOT DRIVE ANY VEHICLE OR OPERATE MECHANICAL EQUIPMENT FOR 24 HOURS FOLLOWING THE END OF YOUR SURGERY.  EVEN THOUGH YOU MAY FEEL NORMAL, YOUR REACTIONS MAY BE AFFECTED BY THE MEDICATION YOU HAVE RECEIVED.    DO NOT DRINK ALCOHOLIC BEVERAGES FOR 24 HOURS FOLLOWING YOUR SURGERY.    DRINK CLEAR LIQUIDS (APPLE JUICE, GINGER ALE, 7-UP, BROTH, ETC.).  PROGRESS TO YOUR REGULAR DIET AS YOU FEEL ABLE.    YOU MAY HAVE A DRY MOUTH, A SORE THROAT, MUSCLES ACHES OR TROUBLE SLEEPING.  THESE SHOULD GO AWAY AFTER 24 HOURS.    CALL YOUR DOCTOR FOR ANY OF THE FOLLOWING:  SIGNS OF INFECTION (FEVER, GROWING TENDERNESS AT THE SURGERY SITE, A LARGE AMOUNT OF DRAINAGE OR BLEEDING, SEVERE PAIN, FOUL-SMELLING DRAINAGE, REDNESS OR SWELLING.    IT HAS BEEN OVER 8 TO 10 HOURS SINCE SURGERY AND YOU ARE STILL NOT ABLE TO URINATE (PASS WATER)    You had 1 NORCO tablet at 1:00 pm.            Pending Results     No orders found from 2/7/2018 to 2/10/2018.            Admission Information     Date & Time Provider Department Dept. Phone    2/9/2018 Trent Perez MD [facility] [phone]      Your Vitals Were     Blood Pressure Pulse Temperature Respirations Height Weight    122/74 68 97.5  F (36.4  C) (Temporal) 17 1.753 m (5' 9\") 84.4 kg (186 lb)    Last Period Pulse Oximetry BMI (Body Mass Index)             01/15/2018 100% 27.47 kg/m2         MyChart Information     [employer] gives you secure access to your electronic health record. If you see a primary care provider, you can also send messages to your care team and make appointments. If you have questions, please call your primary care clinic.  If you " do not have a primary care provider, please call [phone] and they will assist you.        Care EveryWhere ID     This is your Care EveryWhere ID. This could be used by other organizations to access your [place] medical records  [id number]        Equal Access to Services     MIL CALLEJAS : Hadii aad ku hadcayetanogrupo Zahraa, wacindyda luqadaha, qaybta kaalmada adedon, waxzack jonathan abhikeyshawn shore diogenesgera cesar. So [facility] [phone].    ATENCIÓN: Si habla español, tiene a arnold disposición servicios gratuitos de asistencia lingüística. Llame al [phone].    We comply with applicable federal civil rights laws and [place] laws. We do not discriminate on the basis of race, color, national origin, age, disability, sex, sexual orientation, or gender identity.               Review of your medicines      START taking        Dose / Directions    HYDROcodone-acetaminophen 5-325 MG per tablet   Commonly known as:  NORCO   Used for:  Umbilical hernia without obstruction and without gangrene        Dose:  1-2 tablet   Take 1-2 tablets by mouth every 4 hours as needed for other (Moderate to Severe Pain)   Quantity:  15 tablet   Refills:  0         CONTINUE these medicines which have NOT CHANGED        Dose / Directions    PRENATAL VITAMINS PO        Dose:  1 tablet   Take 1 tablet by mouth daily.   Refills:  0            Where to get your medicines      Some of these will need a paper prescription and others can be bought over the counter. Ask your nurse if you have questions.     Bring a paper prescription for each of these medications     HYDROcodone-acetaminophen 5-325 MG per tablet                Protect others around you: Learn how to safely use, store and throw away your medicines at www.disposemymeds.org.        Information about OPIOIDS     PRESCRIPTION OPIOIDS: WHAT YOU NEED TO KNOW    Prescription opioids can be used to help relieve moderate to severe pain and are often prescribed following a surgery or injury, or  for certain health conditions. These medications can be an important part of treatment but also come with serious risks. It is important to work with your health care provider to make sure you are getting the safest, most effective care.    WHAT ARE THE RISKS AND SIDE EFFECTS OF OPIOID USE?  Prescription opioids carry serious risks of addiction and overdose, especially with prolonged use. An opioid overdose, often marked by slowed breathing can cause sudden death. The use of prescription opioids can have a number of side effects as well, even when taken as directed:      Tolerance - meaning you might need to take more of a medication for the same pain relief    Physical dependence - meaning you have symptoms of withdrawal when a medication is stopped    Increased sensitivity to pain    Constipation    Nausea, vomiting, and dry mouth    Sleepiness and dizziness    Confusion    Depression    Low levels of testosterone that can result in lower sex drive, energy, and strength    Itching and sweating    RISKS ARE GREATER WITH:    History of drug misuse, substance use disorder, or overdose    Mental health conditions (such as depression or anxiety)    Sleep apnea    Older age (65 years or older)    Pregnancy    Avoid alcohol while taking prescription opioids.   Also, unless specifically advised by your health care provider, medications to avoid include:    Benzodiazepines (such as Xanax or Valium)    Muscle relaxants (such as Soma or Flexeril)    Hypnotics (such as Ambien or Lunesta)    Other prescription opioids    KNOW YOUR OPTIONS:  Talk to your health care provider about ways to manage your pain that do not involve prescription opioids. Some of these options may actually work better and have fewer risks and side effects:    Pain relievers such as acetaminophen, ibuprofen, and naproxen    Some medications that are also used for depression or seizures    Physical therapy and exercise    Cognitive behavioral therapy, a  psychological, goal-directed approach, in which patients learn how to modify physical, behavioral, and emotional triggers of pain and stress    IF YOU ARE PRESCRIBED OPIOIDS FOR PAIN:    Never take opioids in greater amounts or more often than prescribed    Follow up with your primary health care provider and work together to create a plan on how to manage your pain.    Talk about ways to help manage your pain that do not involve prescription opioids    Talk about all concerns and side effects    Help prevent misuse and abuse    Never sell or share prescription opioids    Never use another person's prescription opioids    Store prescription opioids in a secure place and out of reach of others (this may include visitors, children, friends, and family)    Visit www.cdc.gov/drugoverdose to learn about risks of opioid abuse and overdose    If you believe you may be struggling with addiction, tell your health care provider and ask for guidance or call [facility]'s National Helpline at 0-939-543-HELP    LEARN MORE / www.cdc.gov/drugoverdose/prescribing/guideline.html    Safely dispose of unused prescription opioids: Find your local drug take-back programs and more information about the importance of safe disposal at www.doseofreality.mn.gov             Medication List: This is a list of all your medications and when to take them. Check marks below indicate your daily home schedule. Keep this list as a reference.      Medications           Morning Afternoon Evening Bedtime As Needed    HYDROcodone-acetaminophen 5-325 MG per tablet   Commonly known as:  NORCO   Take 1-2 tablets by mouth every 4 hours as needed for other (Moderate to Severe Pain)   Last time this was given:  1 tablet on 2/9/2018 12:55 PM                                PRENATAL VITAMINS PO   Take 1 tablet by mouth daily.

## 2018-02-09 NOTE — OP NOTE
Procedure Date: 02/09/2018      DATE OF PROCEDURE:  02/09/2018      PREOPERATIVE DIAGNOSIS:  Symptomatic umbilical hernia.      POSTOPERATIVE DIAGNOSIS:  Symptomatic umbilical hernia.      PROCEDURE:  Open repair of umbilical hernia with placement of underlay mesh.      ANESTHESIA:  General plus local.      SURGEON:  Trent Gaytan MD      ASSISTANT:  Morelia Edgar PA-C, whose expertise in exposure, suturing, cutting a suture and suctioning was medically necessary for the operation.      SPECIMENS:  None are submitted.      COMPLICATIONS:  None.      INDICATIONS:  Ms. Hall is a healthy 42-year-old female who had an umbilical hernia present for several years, but had not had any trouble until more recently when she had intermittent bloating and nausea which exacerbated discomfort of the hernia. She had had a laparoscopic procedure previous to this, but her port sites did not seem to be in the placement of the periumbilical region.  Because of her symptoms, the patient sought and desired surgical repair of her hernia.  Risks of procedure including infection, bleeding, harm to structures and hernia recurrence were reviewed.  The patient verbalized understanding of the above and consented to proceed.      FINDINGS:  The patient had a primary defect approximately the size of a nickel, this was repaired with a 6.4 cm Ventralex underlay patch.      DESCRIPTION OF PROCEDURE:  With the patient under excellent general anesthesia in supine position, the abdomen was prepped and draped out in the usual sterile surgical fashion.  Timeout was then performed confirming the patient, procedure to be done as well as drug allergies and site markings.  She did receive a dose of Ancef prior to incision for infectious prophylaxis.  We began by making approximately a 1-1/2 inch curvilinear incision at the superior umbilical skin fold and dissected down to the level of the subcutaneous fat with electrocautery. The hernia contents were  encountered as well.  They were sharply dissected free from the surrounding tissues as well as the underside of the dermis with the Metzenbaum scissors.  They were then amputated at their origin as the hernia defect was relatively small compared to the contents.  This allowed us to reduce the remainder of the hernia contents and grasp and elevate the fascia into view.  The anterior surface of the fascia was cleared of fat in about 2 cm distance radially.  The preperitoneal space was then developed using electrocautery and blunt dissection for several centimeters radially as well.  The fascial defect was about the size of a nickel.  We introduced a 6.4 cm Ventralex patch into the field, soaked it in Irrisept and placed it in the preperitoneal space.  It was unrolled flatly against the posterior aspect of the abdominal wall. The lateral aspects, as well as the inferior and superior apices of the mesh were tacked with transvaginal 0 PDS stitches.  We then closed the fascia over the mesh with an 0 PDS suture in a vest-over-trousers fashion x 2.  30 mL of 0.5% Marcaine were then distributed in the deep tissues and subcutaneous space.  We reapproximated the umbilical skin to the abdominal wall fascia with a 3-0 Vicryl and closed the skin in 2 layers with 3-0 and 4-0 Vicryls.  Skin glue was then applied to the top of the wound. The patient tolerated the procedure well.  She was extubated and brought to recovery in excellent condition.  All sharps and sponge counts were correct at the conclusion of the case.         CAM DE LA CRUZ MD             D: 2018   T: 2018   MT: CRYSTAL      Name:     CLEOPATRA MACHADO   MRN:      [id number]        Account:        [id number]   :      1975           Procedure Date: 2018      Document: [id number]       cc: Orin Cabrera MD

## 2018-02-09 NOTE — ANESTHESIA PREPROCEDURE EVALUATION
Anesthesia Evaluation     . Pt has had prior anesthetic. Type: General    No history of anesthetic complications          ROS/MED HX    ENT/Pulmonary:  - neg pulmonary ROS     Neurologic:  - neg neurologic ROS     Cardiovascular:  - neg cardiovascular ROS       METS/Exercise Tolerance:     Hematologic: Comments: Lab Test        02/08/18     01/08/16     07/15/13                       1602          0658          1549          WBC          9.5           --          8.1           HGB          13.8         13.2         14.5          MCV          92            --          92            PLT          230           --          202            Lab Test        02/08/18     07/15/13                       1602          1549          NA           141          143           POTASSIUM    3.7          3.7           CHLORIDE     108          109           CO2          30           22            BUN          13           14            CR           0.86         0.81          ANIONGAP     3            11            SAKINA          8.5          8.6           GLC          83           94                    Musculoskeletal:  - neg musculoskeletal ROS       GI/Hepatic:  - neg GI/hepatic ROS       Renal/Genitourinary:  - ROS Renal section negative       Endo:  - neg endo ROS       Psychiatric:  - neg psychiatric ROS       Infectious Disease:  - neg infectious disease ROS       Malignancy:         Other:    - neg other ROS                 Physical Exam  Normal systems: cardiovascular, pulmonary and dental    Airway   Mallampati: II  TM distance: >3 FB  Neck ROM: full    Dental     Cardiovascular       Pulmonary                     Anesthesia Plan      History & Physical Review  History and physical reviewed and following examination; no interval change.    ASA Status:  1 .    NPO Status:  > 8 hours    Plan for General and LMA with Intravenous induction. Maintenance will be Balanced.    PONV prophylaxis:  Ondansetron (or other 5HT-3) and  Dexamethasone or Solumedrol       Postoperative Care  Postoperative pain management:  IV analgesics and Oral pain medications.      Consents  Anesthetic plan, risks, benefits and alternatives discussed with:  Patient..                          .

## 2018-02-09 NOTE — IP AVS SNAPSHOT
[facility]    [address]    [facility] [address]    Phone:  [phone]    Fax:  [phone]                                       After Visit Summary   2/9/2018    Laney Hall    MRN: [id number]           After Visit Summary Signature Page     I have received my discharge instructions, and my questions have been answered. I have discussed any challenges I see with this plan with the nurse or doctor.    ..........................................................................................................................................  Patient/Patient Representative Signature      ..........................................................................................................................................  Patient Representative Print Name and Relationship to Patient    ..................................................               ................................................  Date                                            Time    ..........................................................................................................................................  Reviewed by Signature/Title    ...................................................              ..............................................  Date                                                            Time

## 2018-02-09 NOTE — BRIEF OP NOTE
[facility] Brief Operative Note    Pre-operative diagnosis: Umbilical hernia   Post-operative diagnosis Umbilical hernia   Procedure: Procedure(s):  Open repair umbilical hernia with mesh - Wound Class: I-Clean   Surgeon(s): Surgeon(s) and Role:     * Trent Perez MD - Primary     * Morelia Edgar PA-C - Assisting   Estimated blood loss: * No values recorded between 2/9/2018 11:55 AM and 2/9/2018 12:17 PM *    Specimens: * No specimens in log *   Findings: Primary defect measuring approximately 1.5cm containing incarcerated fat, repaired with 6.4cm Ventralex ST underlay mesh.

## 2018-05-01 ENCOUNTER — TELEPHONE (OUTPATIENT)
Dept: INTERNAL MEDICINE | Facility: CLINIC | Age: 43
End: 2018-05-01

## 2018-05-01 NOTE — LETTER
[facility]  [address], [address]  [place], [place]  [address]                                            TEL:[phone]  FAX:[phone]      Laney FARNSWORTH [place] [id number] [address] [place] [id number]      May 14, 2018    Dear Laney,         At [facility] we care about your health and well-being.  A review of your chart has indicated that you are due for fasting labs and PAP screening.  Please contact us at [phone] to schedule an appointment.    If you have already had one or all of the above screening tests at another facility, please call us to update your chart.       Sincerely,      Orin Cabrera M.D.

## 2018-05-01 NOTE — TELEPHONE ENCOUNTER
Panel Management Review      Patient has the following on her problem list: None      Composite cancer screening  Chart review shows that this patient is due/due soon for the following Pap Smear  Summary:    Patient is due/failing the following:   Pap smear. It has also been over a year since her last physical and fasting labs.    Action needed:   Patient needs office visit for routine physical with fasting labs and pap smear.    Type of outreach:    Sent [employer] message.    Questions for provider review:    None                                                                                                                                    Nataliya Ingram [facility]       Chart routed to Care Team.

## 2018-08-16 ENCOUNTER — TELEPHONE (OUTPATIENT)
Dept: INTERNAL MEDICINE | Facility: CLINIC | Age: 43
End: 2018-08-16

## 2018-08-16 NOTE — TELEPHONE ENCOUNTER
Panel Management Review      Patient has the following on her problem list: None      Composite cancer screening  Chart review shows that this patient is due/due soon for the following None  Summary:    Patient is due/failing the following:   Pap smear    Action needed:   Needs OV for routine physical with pap smear.     Type of outreach:    Will not send  message to her right now because we have already done outreach on her two times in the last 2-3 months.     Questions for provider review:    None                                                                                                                                    Nataliya Ingram [facility]       Chart not routed.

## 2019-01-02 ENCOUNTER — TELEPHONE (OUTPATIENT)
Dept: INTERNAL MEDICINE | Facility: CLINIC | Age: 44
End: 2019-01-02

## 2019-01-02 NOTE — TELEPHONE ENCOUNTER
Panel Management Review      Patient has the following on her problem list: None      Composite cancer screening  Chart review shows that this patient is due/due soon for the following Pap Smear  Summary:    Patient is due/failing the following:   Pap smear, mammogram    Action needed:   Needs to schedule routine physical with fasting labs and pap smear, needs mammogram    Type of outreach:    Message left on pt's cell to return call. It would also be good to find out if she goes somewhere else for her primary care? She has only been seen here one time in the last 5 years, and her LOV here was almost a year ago.     Questions for provider review:    None                                                                                                                                        Chart mot routed.

## 2019-01-24 NOTE — TELEPHONE ENCOUNTER
We have done multiple outreach attempts for her over the last several months and she has not returned our calls or messages. Encounter closed.

## 2019-04-12 ENCOUNTER — HOSPITAL ENCOUNTER (OUTPATIENT)
Dept: MAMMOGRAPHY | Facility: CLINIC | Age: 44
Discharge: HOME | End: 2019-04-12
Attending: INTERNAL MEDICINE | Admitting: INTERNAL MEDICINE
Payer: COMMERCIAL

## 2019-04-12 ENCOUNTER — RESULT FOLLOW UP (OUTPATIENT)
Dept: INTERNAL MEDICINE | Facility: CLINIC | Age: 44
End: 2019-04-12

## 2019-04-12 ENCOUNTER — OFFICE VISIT (OUTPATIENT)
Dept: INTERNAL MEDICINE | Facility: CLINIC | Age: 44
End: 2019-04-12
Payer: COMMERCIAL

## 2019-04-12 VITALS
BODY MASS INDEX: 26.91 KG/M2 | TEMPERATURE: 98.6 F | DIASTOLIC BLOOD PRESSURE: 79 MMHG | HEART RATE: 103 BPM | WEIGHT: 181.7 LBS | RESPIRATION RATE: 16 BRPM | HEIGHT: 69 IN | OXYGEN SATURATION: 97 % | SYSTOLIC BLOOD PRESSURE: 112 MMHG

## 2019-04-12 DIAGNOSIS — Z00.00 ROUTINE GENERAL MEDICAL EXAMINATION AT A HEALTH CARE FACILITY: Primary | ICD-10-CM

## 2019-04-12 DIAGNOSIS — Z12.4 SCREENING FOR MALIGNANT NEOPLASM OF CERVIX: ICD-10-CM

## 2019-04-12 DIAGNOSIS — R87.810 CERVICAL HIGH RISK HPV (HUMAN PAPILLOMAVIRUS) TEST POSITIVE: ICD-10-CM

## 2019-04-12 DIAGNOSIS — Z12.39 BREAST CANCER SCREENING: ICD-10-CM

## 2019-04-12 LAB
ALBUMIN SERPL-MCNC: 4 G/DL (ref 3.4–5)
ALP SERPL-CCNC: 63 U/L (ref 40–150)
ALT SERPL W P-5'-P-CCNC: 17 U/L (ref 0–50)
ANION GAP SERPL CALCULATED.3IONS-SCNC: 4 MMOL/L (ref 3–14)
AST SERPL W P-5'-P-CCNC: 13 U/L (ref 0–45)
BILIRUB SERPL-MCNC: 0.7 MG/DL (ref 0.2–1.3)
BUN SERPL-MCNC: 12 MG/DL (ref 7–30)
CALCIUM SERPL-MCNC: 8.8 MG/DL (ref 8.5–10.1)
CHLORIDE SERPL-SCNC: 109 MMOL/L (ref 94–109)
CHOLEST SERPL-MCNC: 195 MG/DL
CO2 SERPL-SCNC: 27 MMOL/L (ref 20–32)
CREAT SERPL-MCNC: 0.98 MG/DL (ref 0.52–1.04)
ERYTHROCYTE [DISTWIDTH] IN BLOOD BY AUTOMATED COUNT: 12.9 % (ref 10–15)
GFR SERPL CREATININE-BSD FRML MDRD: 71 ML/MIN/{1.73_M2}
GLUCOSE SERPL-MCNC: 95 MG/DL (ref 70–99)
HCT VFR BLD AUTO: 46 % (ref 35–47)
HDLC SERPL-MCNC: 69 MG/DL
HGB BLD-MCNC: 15 G/DL (ref 11.7–15.7)
LDLC SERPL CALC-MCNC: 108 MG/DL
MCH RBC QN AUTO: 30.4 PG (ref 26.5–33)
MCHC RBC AUTO-ENTMCNC: 32.6 G/DL (ref 31.5–36.5)
MCV RBC AUTO: 93 FL (ref 78–100)
NONHDLC SERPL-MCNC: 126 MG/DL
PLATELET # BLD AUTO: 260 10E9/L (ref 150–450)
POTASSIUM SERPL-SCNC: 3.7 MMOL/L (ref 3.4–5.3)
PROT SERPL-MCNC: 7.5 G/DL (ref 6.8–8.8)
RBC # BLD AUTO: 4.94 10E12/L (ref 3.8–5.2)
SODIUM SERPL-SCNC: 140 MMOL/L (ref 133–144)
TRIGL SERPL-MCNC: 92 MG/DL
TSH SERPL DL<=0.005 MIU/L-ACNC: 2.22 MU/L (ref 0.4–4)
WBC # BLD AUTO: 9 10E9/L (ref 4–11)

## 2019-04-12 PROCEDURE — G0145 SCR C/V CYTO,THINLAYER,RESCR: HCPCS | Performed by: INTERNAL MEDICINE

## 2019-04-12 PROCEDURE — 80061 LIPID PANEL: CPT | Performed by: INTERNAL MEDICINE

## 2019-04-12 PROCEDURE — 80053 COMPREHEN METABOLIC PANEL: CPT | Performed by: INTERNAL MEDICINE

## 2019-04-12 PROCEDURE — 77067 SCR MAMMO BI INCL CAD: CPT

## 2019-04-12 PROCEDURE — 99396 PREV VISIT EST AGE 40-64: CPT | Performed by: INTERNAL MEDICINE

## 2019-04-12 PROCEDURE — 87624 HPV HI-RISK TYP POOLED RSLT: CPT | Performed by: INTERNAL MEDICINE

## 2019-04-12 PROCEDURE — 85027 COMPLETE CBC AUTOMATED: CPT | Performed by: INTERNAL MEDICINE

## 2019-04-12 PROCEDURE — 84443 ASSAY THYROID STIM HORMONE: CPT | Performed by: INTERNAL MEDICINE

## 2019-04-12 PROCEDURE — 36415 COLL VENOUS BLD VENIPUNCTURE: CPT | Performed by: INTERNAL MEDICINE

## 2019-04-12 ASSESSMENT — ENCOUNTER SYMPTOMS
PARESTHESIAS: 0
COUGH: 0
ARTHRALGIAS: 0
HEADACHES: 0
ABDOMINAL PAIN: 0
FEVER: 0
CHILLS: 0
DIARRHEA: 0
MYALGIAS: 0
DIZZINESS: 0
NERVOUS/ANXIOUS: 0
BREAST MASS: 0
SORE THROAT: 0
NAUSEA: 0
JOINT SWELLING: 0
HEMATOCHEZIA: 0
SHORTNESS OF BREATH: 0
WEAKNESS: 0
HEMATURIA: 0
CONSTIPATION: 0
HEARTBURN: 0
PALPITATIONS: 0
DYSURIA: 0
EYE PAIN: 0
FREQUENCY: 0

## 2019-04-12 ASSESSMENT — MIFFLIN-ST. JEOR: SCORE: 1548.57

## 2019-04-12 NOTE — PROGRESS NOTES
Dr Bauer's note    Patient's instructions / PLAN:                                                        Plan:  1. Labs [address]  2. Mammogram today and annual  ( please call [phone] to schedule it)   3. Follow up 3 years if tests are normal  4. Annual flu shot       ASSESSMENT & PLAN:                                                      (Z00.00) Routine general medical examination at a health care facility  (primary encounter diagnosis)  Comment:   Plan: Comprehensive metabolic panel, CBC with         platelets, Lipid panel reflex to direct LDL         Fasting, TSH with free T4 reflex, Pap imaged         thin layer screen with HPV - recommended age 30        - 65 years (select HPV order below), HPV High         Risk Types DNA Cervical            (Z12.4) Screening for malignant neoplasm of cervix  Comment:   Plan: Pap imaged thin layer screen with HPV -         recommended age 30 - 65 years (select HPV order        below), HPV High Risk Types DNA Cervical               Chief Complaint:                                                        Annual exam    SUBJECTIVE:                                                    History of present illness            ROS:   General: Negative for fever, chills, major weight changes, fatigue  Skin: Negative for rashes, abnormal spots  Eyes: Negative for blurred or double vision  ENT/mouth: Negative for sinuses discomfort, earache, sore throat  Respiratory: Negative for cough, wheezes, chronic lung disease  Cardiovascular: Negative for rest or exertional chest pain, shortness of breath, palpitations, leg edema,   Gastrointestinal: Negative for vomiting, abdominal pain, heartburn, blood in stool, diarrhea, constipation  Genitourinary: Negative for urinary frequency, blood in urine, history of kidney stones  Female: Negative for abnormal vaginal bleeding, vaginal discharge  Neuro: Negative for headaches, numbness, tingling, weakness in arms or legs, history of seizure, recent  syncope  Psychiatry: Negative for depression, anxiety, suicidal thoughts  Endo: Negative for known thyroid disease, diabetes.  Hemato/Lymph: Negative for nodes, easy bleeding, history of DVT, blood transfusion  Musculoskeletal: Negative for joint swelling, back pain      PMHx: - reviewed  Past Medical History:   Diagnosis Date     Renal disease     stent in Ureter 05 for torsion       PSHx: reviewed  Past Surgical History:   Procedure Laterality Date     AS ENLARGE BREAST WITH IMPLANT  2003     C NONSPECIFIC PROCEDURE  2003    breast augmentation     GENITOURINARY SURGERY      ureteral stent 05     HERNIORRHAPHY UMBILICAL N/A 2/9/2018    Procedure: HERNIORRHAPHY UMBILICAL;  Open repair umbilical hernia with mesh;  Surgeon: Trent Perez MD;  Location: [facility] OR        Soc Hx: No daily alcohol, no smoking  Social History     Socioeconomic History     Marital status:      Spouse name: Not on file     Number of children: Not on file     Years of education: Not on file     Highest education level: Not on file   Occupational History     Not on file   Social Needs     Financial resource strain: Not on file     Food insecurity:     Worry: Not on file     Inability: Not on file     Transportation needs:     Medical: Not on file     Non-medical: Not on file   Tobacco Use     Smoking status: Never Smoker     Smokeless tobacco: Never Used     Tobacco comment: NEVER SMOKES ON REGULAR BASIS   Substance and Sexual Activity     Alcohol use: No     Comment: RARELY     Drug use: No     Sexual activity: Yes     Partners: Male     Birth control/protection: Pill     Comment: ZOVIA   Lifestyle     Physical activity:     Days per week: Not on file     Minutes per session: Not on file     Stress: Not on file   Relationships     Social connections:     Talks on phone: Not on file     Gets together: Not on file     Attends Buddhism service: Not on file     Active member of club or organization: Not on file     Attends meetings  "of clubs or organizations: Not on file     Relationship status: Not on file     Intimate partner violence:     Fear of current or ex partner: Not on file     Emotionally abused: Not on file     Physically abused: Not on file     Forced sexual activity: Not on file   Other Topics Concern     Parent/sibling w/ CABG, MI or angioplasty before 65F 55M? Not Asked   Social History Narrative     Not on file        Fam Hx: reviewed  Family History   Problem Relation Age of Onset     Hypertension Maternal Grandmother      Arthritis Maternal Grandmother         RA     Cancer - colorectal Maternal Grandfather          Screening: reviewed      All: reviewed    Meds: reviewed  No current outpatient medications on file.       OBJECTIVE:                                                    Physical Exam :  Blood pressure 112/79, pulse 103, temperature 98.6  F (37  C), temperature source Oral, resp. rate 16, height 1.753 m (5' 9\"), weight 82.4 kg (181 lb 11.2 oz), last menstrual period 03/26/2019, SpO2 97 %, not currently breastfeeding.     NAD, appears comfortable  Skin clear, no rashes  HEENT: PERRLA, EOMI, anicteric sclera, pink conjunctiva, external ears appear normal, bilateral tympanic membranes clinically normal, oropharynx normal color.  Neck: supple, no JVD,  no thyroidmegaly  Lymph nodes non palpable in the cervical, supraclavicular axillaries, inguinal areas  Chest: clear to auscultation with good respiratory effort  Cardiac: S1S2, RRR, no mgr appreciated  Abdomen: soft, not tender, not distended, audible bowel sound, no hepatosplenomegaly, no palpable masses, no abdominal bruits  Extremities: no cyanosis, clubbing or edema.   Neuro: A, Ox3, no focal signs.  Breast exam in supine and erect position: Bilateral breast implants, they are symmetrical, no skin changes, no tenderness or nodes on palpation. Nipples are erect, no skin lesions, no discharge on pressure.    Pelvic exam: Normal external genitals, normal appearing " perineum, normal appearing urethra,  vaginal mucosa pink, no discharge, Cervix appears normal, Pap smear obtained. On bimanual exam, I did not feel any uterus or ovarian masses, and she denies any tenderness.         Orin Bauer MD  Internal Medicine          SUBJECTIVE:   CC: Laney Hall is an 43 year old woman who presents for preventive health visit.     Healthy Habits:     Getting at least 3 servings of Calcium per day:  Yes    Bi-annual eye exam:  Yes    Dental care twice a year:  Yes    Sleep apnea or symptoms of sleep apnea:  None    Diet:  Regular (no restrictions)    Frequency of exercise:  1 day/week    Duration of exercise:  15-30 minutes    Taking medications regularly:  Not Applicable    Medication side effects:  Not applicable    PHQ-2 Total Score: 0    Additional concerns today:  No              Today's PHQ-2 Score:   PHQ-2 ( 1999 [employer]) 4/12/2019   Q1: Little interest or pleasure in doing things 0   Q2: Feeling down, depressed or hopeless 0   PHQ-2 Score 0   Q1: Little interest or pleasure in doing things Not at all   Q2: Feeling down, depressed or hopeless Not at all   PHQ-2 Score 0       Abuse: Current or Past(Physical, Sexual or Emotional)- No  Do you feel safe in your environment? Yes    Social History     Tobacco Use     Smoking status: Never Smoker     Smokeless tobacco: Never Used     Tobacco comment: NEVER SMOKES ON REGULAR BASIS   Substance Use Topics     Alcohol use: No     Comment: RARELY     If you drink alcohol do you typically have >3 drinks per day or >7 drinks per week? No    Alcohol Use 4/12/2019   Prescreen: >3 drinks/day or >7 drinks/week? No   No flowsheet data found.    Reviewed orders with patient.  Reviewed health maintenance and updated orders accordingly - Yes        Mammogram scheduled for today.    Pertinent mammograms are reviewed under the imaging tab.  History of abnormal Pap smear: NO - age 30- 65 PAP every 3 years recommended     Reviewed and updated as needed  "this visit by clinical staff  Tobacco  Allergies  Meds  Med Hx  Surg Hx  Fam Hx  Soc Hx        Reviewed and updated as needed this visit by Provider            Review of Systems   Constitutional: Negative for chills and fever.   HENT: Negative for congestion, ear pain, hearing loss and sore throat.    Eyes: Negative for pain and visual disturbance.   Respiratory: Negative for cough and shortness of breath.    Cardiovascular: Negative for chest pain, palpitations and peripheral edema.   Gastrointestinal: Negative for abdominal pain, constipation, diarrhea, heartburn, hematochezia and nausea.   Breasts:  Negative for tenderness, breast mass and discharge.   Genitourinary: Negative for dysuria, frequency, genital sores, hematuria, pelvic pain, urgency, vaginal bleeding and vaginal discharge.   Musculoskeletal: Negative for arthralgias, joint swelling and myalgias.   Skin: Negative for rash.   Neurological: Negative for dizziness, weakness, headaches and paresthesias.   Psychiatric/Behavioral: Negative for mood changes. The patient is not nervous/anxious.             COUNSELING:  Reviewed preventive health counseling, as reflected in patient instructions       Regular exercise       Healthy diet/nutrition    BP Readings from Last 1 Encounters:   04/12/19 112/79     Estimated body mass index is 26.83 kg/m  as calculated from the following:    Height as of this encounter: 1.753 m (5' 9\").    Weight as of this encounter: 82.4 kg (181 lb 11.2 oz).           reports that she has never smoked. She has never used smokeless tobacco.      Counseling Resources:  ATP IV Guidelines  Pooled Cohorts Equation Calculator  Breast Cancer Risk Calculator  FRAX Risk Assessment  ICSI Preventive Guidelines  Dietary Guidelines for Americans, 2010  USDA's MyPlate  ASA Prophylaxis  Lung CA Screening    Orin Cabrera MD  [facility]  "

## 2019-04-12 NOTE — PATIENT INSTRUCTIONS
Plan:  1. Labs [address]  2. Mammogram today and annual  ( please call [phone] to schedule it)   3. Follow up 3 years if tests are normal  4. Annual flu shot

## 2019-04-16 LAB
COPATH REPORT: NORMAL
PAP: NORMAL

## 2019-04-17 PROBLEM — R87.810 CERVICAL HIGH RISK HPV (HUMAN PAPILLOMAVIRUS) TEST POSITIVE: Status: ACTIVE | Noted: 2019-04-12

## 2019-04-17 LAB
FINAL DIAGNOSIS: ABNORMAL
HPV HR 12 DNA CVX QL NAA+PROBE: POSITIVE
HPV16 DNA SPEC QL NAA+PROBE: NEGATIVE
HPV18 DNA SPEC QL NAA+PROBE: NEGATIVE
SPECIMEN DESCRIPTION: ABNORMAL
SPECIMEN SOURCE CVX/VAG CYTO: ABNORMAL

## 2019-04-17 NOTE — PROGRESS NOTES
4/12/19 NIL pap, + HR HPV (not 16/18). Plan 1 year cotest  4/18/19 left msg (lks)  4/19/19 detailed msg per pt request (lks)  6/2/20 RFUE closed. Now tracking in CCT

## 2020-06-23 ENCOUNTER — HOSPITAL ENCOUNTER (OUTPATIENT)
Dept: MAMMOGRAPHY | Facility: CLINIC | Age: 45
Discharge: HOME | End: 2020-06-23
Attending: INTERNAL MEDICINE | Admitting: INTERNAL MEDICINE
Payer: COMMERCIAL

## 2020-06-23 DIAGNOSIS — Z12.31 VISIT FOR SCREENING MAMMOGRAM: ICD-10-CM

## 2020-06-23 PROCEDURE — 77067 SCR MAMMO BI INCL CAD: CPT

## 2020-07-04 ENCOUNTER — MYC MEDICAL ADVICE (OUTPATIENT)
Dept: INTERNAL MEDICINE | Facility: CLINIC | Age: 45
End: 2020-07-04

## 2020-07-06 ENCOUNTER — DOCUMENTATION ONLY (OUTPATIENT)
Dept: LAB | Facility: CLINIC | Age: 45
End: 2020-07-06

## 2020-07-07 NOTE — PROGRESS NOTES
Patient actually scheduled a lab appointment immediately following her physical with primary care provider.  She will have labs drawn after seeing MD who will place orders at that visit.  CARLOS Humphreys R.N.

## 2020-07-16 ENCOUNTER — OFFICE VISIT (OUTPATIENT)
Dept: INTERNAL MEDICINE | Facility: CLINIC | Age: 45
End: 2020-07-16
Payer: COMMERCIAL

## 2020-07-16 VITALS
DIASTOLIC BLOOD PRESSURE: 80 MMHG | BODY MASS INDEX: 24.73 KG/M2 | HEIGHT: 69 IN | RESPIRATION RATE: 16 BRPM | SYSTOLIC BLOOD PRESSURE: 114 MMHG | HEART RATE: 64 BPM | OXYGEN SATURATION: 98 % | WEIGHT: 167 LBS

## 2020-07-16 DIAGNOSIS — Z00.00 ROUTINE GENERAL MEDICAL EXAMINATION AT A HEALTH CARE FACILITY: Primary | ICD-10-CM

## 2020-07-16 LAB
ALBUMIN SERPL-MCNC: 3.6 G/DL (ref 3.4–5)
ALP SERPL-CCNC: 53 U/L (ref 40–150)
ALT SERPL W P-5'-P-CCNC: 17 U/L (ref 0–50)
ANION GAP SERPL CALCULATED.3IONS-SCNC: 7 MMOL/L (ref 3–14)
AST SERPL W P-5'-P-CCNC: 12 U/L (ref 0–45)
BILIRUB SERPL-MCNC: 0.5 MG/DL (ref 0.2–1.3)
BUN SERPL-MCNC: 16 MG/DL (ref 7–30)
CALCIUM SERPL-MCNC: 8.2 MG/DL (ref 8.5–10.1)
CHLORIDE SERPL-SCNC: 109 MMOL/L (ref 94–109)
CHOLEST SERPL-MCNC: 196 MG/DL
CO2 SERPL-SCNC: 22 MMOL/L (ref 20–32)
CREAT SERPL-MCNC: 0.94 MG/DL (ref 0.52–1.04)
ERYTHROCYTE [DISTWIDTH] IN BLOOD BY AUTOMATED COUNT: 12.9 % (ref 10–15)
GFR SERPL CREATININE-BSD FRML MDRD: 74 ML/MIN/{1.73_M2}
GLUCOSE SERPL-MCNC: 86 MG/DL (ref 70–99)
HCT VFR BLD AUTO: 43.8 % (ref 35–47)
HDLC SERPL-MCNC: 73 MG/DL
HGB BLD-MCNC: 14.3 G/DL (ref 11.7–15.7)
LDLC SERPL CALC-MCNC: 110 MG/DL
MCH RBC QN AUTO: 31.4 PG (ref 26.5–33)
MCHC RBC AUTO-ENTMCNC: 32.6 G/DL (ref 31.5–36.5)
MCV RBC AUTO: 96 FL (ref 78–100)
NONHDLC SERPL-MCNC: 123 MG/DL
PLATELET # BLD AUTO: 199 10E9/L (ref 150–450)
POTASSIUM SERPL-SCNC: 4.1 MMOL/L (ref 3.4–5.3)
PROT SERPL-MCNC: 7.1 G/DL (ref 6.8–8.8)
RBC # BLD AUTO: 4.56 10E12/L (ref 3.8–5.2)
SODIUM SERPL-SCNC: 138 MMOL/L (ref 133–144)
TRIGL SERPL-MCNC: 65 MG/DL
TSH SERPL DL<=0.005 MIU/L-ACNC: 2.12 MU/L (ref 0.4–4)
WBC # BLD AUTO: 8.2 10E9/L (ref 4–11)

## 2020-07-16 PROCEDURE — G0145 SCR C/V CYTO,THINLAYER,RESCR: HCPCS | Performed by: INTERNAL MEDICINE

## 2020-07-16 PROCEDURE — 84443 ASSAY THYROID STIM HORMONE: CPT | Performed by: INTERNAL MEDICINE

## 2020-07-16 PROCEDURE — 85027 COMPLETE CBC AUTOMATED: CPT | Performed by: INTERNAL MEDICINE

## 2020-07-16 PROCEDURE — 36415 COLL VENOUS BLD VENIPUNCTURE: CPT | Performed by: INTERNAL MEDICINE

## 2020-07-16 PROCEDURE — 80053 COMPREHEN METABOLIC PANEL: CPT | Performed by: INTERNAL MEDICINE

## 2020-07-16 PROCEDURE — 80061 LIPID PANEL: CPT | Performed by: INTERNAL MEDICINE

## 2020-07-16 PROCEDURE — 87624 HPV HI-RISK TYP POOLED RSLT: CPT | Performed by: INTERNAL MEDICINE

## 2020-07-16 PROCEDURE — 99396 PREV VISIT EST AGE 40-64: CPT | Performed by: INTERNAL MEDICINE

## 2020-07-16 ASSESSMENT — ENCOUNTER SYMPTOMS
BREAST MASS: 0
SHORTNESS OF BREATH: 0
CHILLS: 0
WEAKNESS: 0
ARTHRALGIAS: 0
MYALGIAS: 0
HEMATURIA: 0
PARESTHESIAS: 0
FREQUENCY: 0
HEMATOCHEZIA: 0
DYSURIA: 0
NAUSEA: 0
ABDOMINAL PAIN: 0
DIZZINESS: 0
SORE THROAT: 0
NERVOUS/ANXIOUS: 0
COUGH: 0
JOINT SWELLING: 0
HEADACHES: 0
DIARRHEA: 0
FEVER: 0
EYE PAIN: 0
CONSTIPATION: 0
HEARTBURN: 0
PALPITATIONS: 0

## 2020-07-16 ASSESSMENT — PATIENT HEALTH QUESTIONNAIRE - PHQ9
SUM OF ALL RESPONSES TO PHQ QUESTIONS 1-9: 0
10. IF YOU CHECKED OFF ANY PROBLEMS, HOW DIFFICULT HAVE THESE PROBLEMS MADE IT FOR YOU TO DO YOUR WORK, TAKE CARE OF THINGS AT HOME, OR GET ALONG WITH OTHER PEOPLE: NOT DIFFICULT AT ALL
SUM OF ALL RESPONSES TO PHQ QUESTIONS 1-9: 0

## 2020-07-16 ASSESSMENT — MIFFLIN-ST. JEOR: SCORE: 1476.89

## 2020-07-16 NOTE — PROGRESS NOTES
Dr Bauer's note    Patient's instructions / PLAN:                                                        Plan:  1.  Labs today - [address]     ASSESSMENT & PLAN:                                                      (Z00.00) Routine general medical examination at a health care facility  (primary encounter diagnosis)  Comment:   Plan: Pap imaged thin layer screen with HPV -         recommended age 30 - 65 years (select HPV order        below), Comprehensive metabolic panel, CBC with        platelets, Lipid panel reflex to direct LDL         Fasting, TSH with free T4 reflex, Pap imaged         thin layer screen with HPV - recommended age 30        - 65 years (select HPV order below), HPV High         Risk Types DNA Cervical               Chief Complaint:                                                        Annual exam  Follow up chronic medical problems      SUBJECTIVE:                                                    History of present illness     We reviewed the chronic medical problems as above.   I reviewed the recent tests results in Epic       ROS:    See below        PMHx: - reviewed  Past Medical History:   Diagnosis Date     Cervical high risk HPV (human papillomavirus) test positive 04/12/2019    see problem list     Renal disease     stent in Ureter 05 for torsion       PSHx: reviewed  Past Surgical History:   Procedure Laterality Date     AS ENLARGE BREAST WITH IMPLANT  2003     C NONSPECIFIC PROCEDURE  2003    breast augmentation     GENITOURINARY SURGERY      ureteral stent 05     HERNIORRHAPHY UMBILICAL N/A 2/9/2018    Procedure: HERNIORRHAPHY UMBILICAL;  Open repair umbilical hernia with mesh;  Surgeon: Trent Perez MD;  Location: [facility] OR        Soc Hx: No daily alcohol, no smoking  Social History     Socioeconomic History     Marital status:      Spouse name: Not on file     Number of children: Not on file     Years of education: Not on file     Highest education level: Not on  "file   Occupational History     Not on file   Social Needs     Financial resource strain: Not on file     Food insecurity     Worry: Not on file     Inability: Not on file     Transportation needs     Medical: Not on file     Non-medical: Not on file   Tobacco Use     Smoking status: Never Smoker     Smokeless tobacco: Never Used     Tobacco comment: NEVER SMOKES ON REGULAR BASIS   Substance and Sexual Activity     Alcohol use: No     Comment: RARELY     Drug use: No     Sexual activity: Yes     Partners: Male     Birth control/protection: Pill     Comment: ZOVIA   Lifestyle     Physical activity     Days per week: Not on file     Minutes per session: Not on file     Stress: Not on file   Relationships     Social connections     Talks on phone: Not on file     Gets together: Not on file     Attends Yarsanism service: Not on file     Active member of club or organization: Not on file     Attends meetings of clubs or organizations: Not on file     Relationship status: Not on file     Intimate partner violence     Fear of current or ex partner: Not on file     Emotionally abused: Not on file     Physically abused: Not on file     Forced sexual activity: Not on file   Other Topics Concern     Parent/sibling w/ CABG, MI or angioplasty before 65F 55M? Not Asked   Social History Narrative     Not on file        Fam Hx: reviewed  Family History   Problem Relation Age of Onset     Hypertension Maternal Grandmother      Arthritis Maternal Grandmother         RA     Cancer - colorectal Maternal Grandfather          Screening: reviewed      All: reviewed    Meds: reviewed  No current outpatient medications on file.       OBJECTIVE:                                                    Physical Exam :  Blood pressure 114/80, pulse 64, resp. rate 16, height 1.753 m (5' 9\"), weight 75.8 kg (167 lb), last menstrual period 07/01/2020, SpO2 98 %, not currently breastfeeding.     NAD, appears comfortable  Skin clear, no rashes  HEENT: " PERRLA, EOMI, anicteric sclera, pink conjunctiva, external ears appear normal, bilateral tympanic membranes clinically normal, oropharynx normal color.  Neck: supple, no JVD,  no thyroidmegaly  Lymph nodes non palpable in the cervical, supraclavicular axillaries, inguinal areas  Chest: clear to auscultation with good respiratory effort  Cardiac: S1S2, RRR, no mgr appreciated  Abdomen: soft, not tender, not distended, audible bowel sound, no hepatosplenomegaly, no palpable masses, no abdominal bruits  Extremities: no cyanosis, clubbing or edema.   Neuro: A, Ox3, no focal signs.  Breast exam in supine and erect position: they are symmetrical, no skin changes, no tenderness or nodes on palpation. Nipples are erect, no skin lesions, no discharge on pressure.    Pelvic exam: Normal external genitals, normal appearing perineum, normal appearing urethra,  vaginal mucosa pink, no discharge, Cervix appears normal, Pap smear obtained. On bimanual exam, I did not feel any uterus or ovarian masses, and she denies any tenderness.         Orin Bauer MD  Internal Medicine        SUBJECTIVE:   CC: Laney Hall is an 44 year old woman who presents for preventive health visit.     Healthy Habits:     Getting at least 3 servings of Calcium per day:  Yes    Bi-annual eye exam:  NO    Dental care twice a year:  Yes    Sleep apnea or symptoms of sleep apnea:  None    Diet:  Regular (no restrictions)    Frequency of exercise:  2-3 days/week    Duration of exercise:  15-30 minutes    Taking medications regularly:  Yes    Medication side effects:  Not applicable    PHQ-2 Total Score: 0    Additional concerns today:  No    Ability to successfully perform activities of daily living: Yes, no assistance needed  Home safety:  none identified   Hearing impairment:  No            Today's PHQ-2 Score:   PHQ-2 ( 1999 Pfizer) 7/16/2020   Q1: Little interest or pleasure in doing things 0   Q2: Feeling down, depressed or hopeless 0   PHQ-2 Score 0    Q1: Little interest or pleasure in doing things Not at all   Q2: Feeling down, depressed or hopeless Not at all   PHQ-2 Score 0       Abuse: Current or Past(Physical, Sexual or Emotional)- No  Do you feel safe in your environment? No        Social History     Tobacco Use     Smoking status: Never Smoker     Smokeless tobacco: Never Used     Tobacco comment: NEVER SMOKES ON REGULAR BASIS   Substance Use Topics     Alcohol use: No     Comment: RARELY         Alcohol Use 7/16/2020   Prescreen: >3 drinks/day or >7 drinks/week? No   Prescreen: >3 drinks/day or >7 drinks/week? -       Reviewed orders with patient.  Reviewed health maintenance and updated orders accordingly - Yes  Labs reviewed in EPIC    Mammogram not appropriate for this patient based on age.    Pertinent mammograms are reviewed under the imaging tab.  History of abnormal Pap smear:   PAP / HPV Latest Ref Rng & Units 4/12/2019   PAP - NIL   HPV 16 DNA NEG:Negative Negative   HPV 18 DNA NEG:Negative Negative   OTHER HR HPV NEG:Negative Positive(A)     Reviewed and updated as needed this visit by clinical staff  Allergies         Reviewed and updated as needed this visit by Provider              Review of Systems   Constitutional: Negative for chills and fever.   HENT: Negative for congestion, ear pain, hearing loss and sore throat.    Eyes: Negative for pain and visual disturbance.   Respiratory: Negative for cough and shortness of breath.    Cardiovascular: Negative for chest pain, palpitations and peripheral edema.   Gastrointestinal: Negative for abdominal pain, constipation, diarrhea, heartburn, hematochezia and nausea.   Breasts:  Negative for tenderness, breast mass and discharge.   Genitourinary: Negative for dysuria, frequency, genital sores, hematuria, pelvic pain, urgency, vaginal bleeding and vaginal discharge.   Musculoskeletal: Negative for arthralgias, joint swelling and myalgias.   Skin: Negative for rash.   Neurological: Negative for  "dizziness, weakness, headaches and paresthesias.   Psychiatric/Behavioral: Negative for mood changes. The patient is not nervous/anxious.        COUNSELING:  Reviewed preventive health counseling, as reflected in patient instructions       Regular exercise       Healthy diet/nutrition    Estimated body mass index is 26.83 kg/m  as calculated from the following:    Height as of 4/12/19: 1.753 m (5' 9\").    Weight as of 4/12/19: 82.4 kg (181 lb 11.2 oz).         reports that she has never smoked. She has never used smokeless tobacco.      Counseling Resources:  ATP IV Guidelines  Pooled Cohorts Equation Calculator  Breast Cancer Risk Calculator  FRAX Risk Assessment  ICSI Preventive Guidelines  Dietary Guidelines for Americans, 2010  [employer]'s MyPlate  ASA Prophylaxis  Lung CA Screening    Orin Cabrera MD  [facility]  Answers for HPI/ROS submitted by the patient on 7/16/2020   Annual Exam:  If you checked off any problems, how difficult have these problems made it for you to do your work, take care of things at home, or get along with other people?: Not difficult at all  PHQ9 TOTAL SCORE: 0    "

## 2020-07-16 NOTE — LETTER
July 27, 2020    Laney Hall  [address] [address] [place] [id number]    Dear ,  This letter is regarding your recent Pap smear (cervical cancer screening) and Human Papillomavirus (HPV) test.  We are happy to inform you that your Pap smear result is normal. Cervical cancer is closely linked with certain types of HPV. Your results showed no evidence of high-risk HPV.  We recommend you have your next PAP smear and HPV test in 3 years.  You will still need to return to the clinic every year for an annual exam and other preventive tests.  If you have additional questions regarding this result, please call our registered nurse, Brandy at [phone].  Sincerely,    Orin Cabrera MD //[facility]

## 2020-07-17 ASSESSMENT — PATIENT HEALTH QUESTIONNAIRE - PHQ9: SUM OF ALL RESPONSES TO PHQ QUESTIONS 1-9: 0

## 2020-07-20 LAB
COPATH REPORT: NORMAL
PAP: NORMAL

## 2020-07-23 LAB
FINAL DIAGNOSIS: NORMAL
HPV HR 12 DNA CVX QL NAA+PROBE: NEGATIVE
HPV16 DNA SPEC QL NAA+PROBE: NEGATIVE
HPV18 DNA SPEC QL NAA+PROBE: NEGATIVE
SPECIMEN DESCRIPTION: NORMAL
SPECIMEN SOURCE CVX/VAG CYTO: NORMAL

## 2023-02-24 ENCOUNTER — TELEPHONE (OUTPATIENT)
Dept: INTERNAL MEDICINE | Facility: CLINIC | Age: 48
End: 2023-02-24
Payer: COMMERCIAL

## 2023-02-24 DIAGNOSIS — Z12.11 SPECIAL SCREENING FOR MALIGNANT NEOPLASMS, COLON: Primary | ICD-10-CM

## 2023-02-24 NOTE — TELEPHONE ENCOUNTER
General Call    Contacts       Type Contact Phone/Fax    02/24/2023 09:09 AM CST Phone (Incoming) Laney Hall (Self)      Pt would like a physical done before June with this provider.  No available openings for csol to schedule.  Please call pt back at         Reason for Call:  Pt would like a physical done before June with this provider.  No available openings for csol to schedule.  Please call pt back at     What are your questions or concerns:  na    Date of last appointment with provider: na    Could we send this information to you in [employer] or would you prefer to receive a phone call?:   Patient would prefer a phone call   Okay to leave a detailed message?: Yes at Cell number on file:    Telephone Information:   Mobile [phone]

## 2023-03-09 DIAGNOSIS — Z12.31 ENCOUNTER FOR SCREENING MAMMOGRAM FOR BREAST CANCER: Primary | ICD-10-CM

## 2023-03-09 NOTE — TELEPHONE ENCOUNTER
Patient would like to stick with Dr Bauer. Has an physical appointment scheduled. Requesting a Colonoscopy order put in so patient can get it done in April and then follow up with Dr Bauer.    Please call patient once order put in.   Phone#: [phone]

## 2023-03-14 ENCOUNTER — TELEPHONE (OUTPATIENT)
Dept: GASTROENTEROLOGY | Facility: CLINIC | Age: 48
End: 2023-03-14
Payer: COMMERCIAL

## 2023-03-14 NOTE — TELEPHONE ENCOUNTER
Caller: Laney Hall    Reason for Reschedule/Cancellation (please be detailed, any staff messages or encounters to note?): pt request       Prior to reschedule please review:    Ordering Provider:Orin Cabrera MD     Sedation per order:mod    Does patient have any ASC Exclusions, please identify?:       Notes on Cancelled Procedure:    Procedure:Lower Endoscopy [Colonoscopy]     Date: 06/08/2023    Location[facility]; [facility] [address] [place] [address]    Surgeon: MARAH        Rescheduled: Yes    Procedure: Lower Endoscopy [Colonoscopy]     Date: 06/18/2023    Location:[facility]; [facility] [address] [place] [address]    Surgeon: PASQUALE    Sedation Level Scheduled  CS,  Reason for Sedation Level SCREENING     Prep/Instructions updated and sent: SILVANA

## 2023-03-14 NOTE — TELEPHONE ENCOUNTER
Screening Questions  BLUE  KIND OF PREP RED  LOCATION [review exclusion criteria] GREEN  SEDATION TYPE        y Are you active on mychart?       Lizette-Bo Ordering/Referring Provider?        [facility] What type of coverage do you have?      n Have you had a positive covid test in the last 14 days?     25.8 1. BMI  [BMI 40+ - review exclusion criteria]    y  2. Are you able to give consent for your medical care? [IF NO,RN REVIEW]          n  3. Are you taking any prescription pain medications on a routine schedule   (ex narcotics: oxycodone, roxicodone, oxycontin,  and percocet)? [RN Review]        n  3a. EXTENDED PREP What kind of prescription?     n 4. Do you have any chemical dependencies such as alcohol, street drugs, or methadone?        **If yes 3- 5 , please schedule with MAC sedation.**          IF YES TO ANY 6 - 10 - HOSPITAL SETTING ONLY.     n 6.   Do you need assistance transferring?     n 7.   Have you had a heart or lung transplant?    n 8.   Are you currently on dialysis?   n 9.   Do you use daily home oxygen?   n 10. Do you take nitroglycerin?   10a. n If yes, how often?     11. [FEMALES]  n Are you currently pregnant?    11a. n If yes, how many weeks? [ Greater than 12 weeks, OR NEEDED]    n 12. Do you have Pulmonary Hypertension? *NEED PAC APPT AT [facility] w/ MAC*     n 13. [review exclusion criteria]  Do you have any implantable devices in your body (pacemaker, defib, LVAD)?    n 14. In the past 6 months, have you had any heart related issues including cardiomyopathy or heart attack?     14a. n If yes, did it require cardiac stenting if so when?     n 15. Have you had a stroke or Transient ischemic attack (TIA - aka  mini stroke ) within 6 months?      n 16. Do you have mod to severe Obstructive Sleep Apnea?  [Hospital only]    n 17. Do you have SEVERE AND UNCONTROLLED asthma? *NEED PAC APPT AT [facility] w/MAC*     18. Are you currently taking any blood thinners?     18a. No. Continue to 19.   18b.  "    n 19. Do you take the medication Phentermine?    19a. If yes, \"Hold for 7 days before procedure.  Please consult your prescribing provider if you have questions about holding this medication.\"     n  20. Do you have chronic kidney disease?      n  21. Do you have a diagnosis of diabetes?     n  22. On a regular basis do you go 3-5 days between bowel movements?      23. Preferred LOCAL Pharmacy for Pre Prescription    [ LIST ONLY ONE PHARMACY]        [facility]        - [facility] REMINDERS -    Informed patient they will need an adult    Cannot take any type of public or medical transportation alone    Conscious Sedation- Needs  for 6 hours after the procedure       MAC/General-Needs  for 24 hours after procedure    Pre-Procedure Covid test to be completed [[facility] PCR Testing Required]    Confirmed Nurse will call to complete assessment       - SCHEDULING DETAILS -  n Hospital Setting Required? If yes, what is the exclusion?:    Sanaz  Surgeon    6/8/23  Date of Procedure  Lower Endoscopy [Colonoscopy]  Type of Procedure Scheduled  [facility]-If you answer yes to questions #8, #20, #21Which Colonoscopy Prep was Sent?     CS Sedation Type     n PAC / Pre-op Required                 "

## 2023-05-30 RX ORDER — BISACODYL 5 MG/1
TABLET, DELAYED RELEASE ORAL
Qty: 4 TABLET | Refills: 0 | Status: SHIPPED | OUTPATIENT
Start: 2023-05-30 | End: 2023-06-12

## 2023-06-05 ASSESSMENT — ENCOUNTER SYMPTOMS
HEARTBURN: 0
EYE PAIN: 0
DIZZINESS: 0
ABDOMINAL PAIN: 0
CHILLS: 0
MYALGIAS: 0
DIARRHEA: 0
COUGH: 0
HEADACHES: 0
BREAST MASS: 0
JOINT SWELLING: 0
FEVER: 0
HEMATOCHEZIA: 0
SORE THROAT: 0
FREQUENCY: 0
DYSURIA: 0
PARESTHESIAS: 0
WEAKNESS: 0
CONSTIPATION: 0
HEMATURIA: 0
PALPITATIONS: 0
NAUSEA: 0
SHORTNESS OF BREATH: 0
NERVOUS/ANXIOUS: 0
ARTHRALGIAS: 0

## 2023-06-12 ENCOUNTER — HOSPITAL ENCOUNTER (OUTPATIENT)
Dept: MAMMOGRAPHY | Facility: CLINIC | Age: 48
Discharge: HOME | End: 2023-06-12
Attending: INTERNAL MEDICINE | Admitting: INTERNAL MEDICINE
Payer: COMMERCIAL

## 2023-06-12 ENCOUNTER — OFFICE VISIT (OUTPATIENT)
Dept: INTERNAL MEDICINE | Facility: CLINIC | Age: 48
End: 2023-06-12
Payer: COMMERCIAL

## 2023-06-12 VITALS
RESPIRATION RATE: 18 BRPM | WEIGHT: 181.2 LBS | OXYGEN SATURATION: 99 % | DIASTOLIC BLOOD PRESSURE: 70 MMHG | HEART RATE: 92 BPM | SYSTOLIC BLOOD PRESSURE: 106 MMHG | TEMPERATURE: 96.2 F | BODY MASS INDEX: 26.84 KG/M2 | HEIGHT: 69 IN

## 2023-06-12 DIAGNOSIS — Z00.00 ROUTINE GENERAL MEDICAL EXAMINATION AT A HEALTH CARE FACILITY: Primary | ICD-10-CM

## 2023-06-12 DIAGNOSIS — Z12.4 CERVICAL CANCER SCREENING: ICD-10-CM

## 2023-06-12 DIAGNOSIS — Z12.11 SCREEN FOR COLON CANCER: ICD-10-CM

## 2023-06-12 DIAGNOSIS — Z11.59 NEED FOR HEPATITIS C SCREENING TEST: ICD-10-CM

## 2023-06-12 DIAGNOSIS — Z12.31 ENCOUNTER FOR SCREENING MAMMOGRAM FOR MALIGNANT NEOPLASM OF BREAST: ICD-10-CM

## 2023-06-12 LAB
ALBUMIN SERPL BCG-MCNC: 4.3 G/DL (ref 3.5–5.2)
ALP SERPL-CCNC: 63 U/L (ref 35–104)
ALT SERPL W P-5'-P-CCNC: 17 U/L (ref 10–35)
ANION GAP SERPL CALCULATED.3IONS-SCNC: 10 MMOL/L (ref 7–15)
AST SERPL W P-5'-P-CCNC: 21 U/L (ref 10–35)
BILIRUB SERPL-MCNC: 0.5 MG/DL
BUN SERPL-MCNC: 11.9 MG/DL (ref 6–20)
CALCIUM SERPL-MCNC: 9 MG/DL (ref 8.6–10)
CHLORIDE SERPL-SCNC: 105 MMOL/L (ref 98–107)
CHOLEST SERPL-MCNC: 198 MG/DL
CREAT SERPL-MCNC: 0.91 MG/DL (ref 0.51–0.95)
DEPRECATED HCO3 PLAS-SCNC: 26 MMOL/L (ref 22–29)
ERYTHROCYTE [DISTWIDTH] IN BLOOD BY AUTOMATED COUNT: 12.3 % (ref 10–15)
GFR SERPL CREATININE-BSD FRML MDRD: 78 ML/MIN/1.73M2
GLUCOSE SERPL-MCNC: 102 MG/DL (ref 70–99)
HCT VFR BLD AUTO: 41.2 % (ref 35–47)
HDLC SERPL-MCNC: 77 MG/DL
HGB BLD-MCNC: 13.7 G/DL (ref 11.7–15.7)
LDLC SERPL CALC-MCNC: 103 MG/DL
MCH RBC QN AUTO: 30.6 PG (ref 26.5–33)
MCHC RBC AUTO-ENTMCNC: 33.3 G/DL (ref 31.5–36.5)
MCV RBC AUTO: 92 FL (ref 78–100)
NONHDLC SERPL-MCNC: 121 MG/DL
PLATELET # BLD AUTO: 219 10E3/UL (ref 150–450)
POTASSIUM SERPL-SCNC: 4.1 MMOL/L (ref 3.4–5.3)
PROT SERPL-MCNC: 6.7 G/DL (ref 6.4–8.3)
RBC # BLD AUTO: 4.47 10E6/UL (ref 3.8–5.2)
SODIUM SERPL-SCNC: 141 MMOL/L (ref 136–145)
TRIGL SERPL-MCNC: 92 MG/DL
TSH SERPL DL<=0.005 MIU/L-ACNC: 1.74 UIU/ML (ref 0.3–4.2)
WBC # BLD AUTO: 6.2 10E3/UL (ref 4–11)

## 2023-06-12 PROCEDURE — 77067 SCR MAMMO BI INCL CAD: CPT

## 2023-06-12 PROCEDURE — G0124 SCREEN C/V THIN LAYER BY MD: HCPCS

## 2023-06-12 PROCEDURE — 80053 COMPREHEN METABOLIC PANEL: CPT | Performed by: INTERNAL MEDICINE

## 2023-06-12 PROCEDURE — 85027 COMPLETE CBC AUTOMATED: CPT | Performed by: INTERNAL MEDICINE

## 2023-06-12 PROCEDURE — G0145 SCR C/V CYTO,THINLAYER,RESCR: HCPCS | Performed by: INTERNAL MEDICINE

## 2023-06-12 PROCEDURE — 87624 HPV HI-RISK TYP POOLED RSLT: CPT | Performed by: INTERNAL MEDICINE

## 2023-06-12 PROCEDURE — 80061 LIPID PANEL: CPT | Performed by: INTERNAL MEDICINE

## 2023-06-12 PROCEDURE — 36415 COLL VENOUS BLD VENIPUNCTURE: CPT | Performed by: INTERNAL MEDICINE

## 2023-06-12 PROCEDURE — 84443 ASSAY THYROID STIM HORMONE: CPT | Performed by: INTERNAL MEDICINE

## 2023-06-12 PROCEDURE — 99396 PREV VISIT EST AGE 40-64: CPT | Performed by: INTERNAL MEDICINE

## 2023-06-12 PROCEDURE — 86803 HEPATITIS C AB TEST: CPT | Performed by: INTERNAL MEDICINE

## 2023-06-12 RX ORDER — BISACODYL 5 MG/1
TABLET, DELAYED RELEASE ORAL
Qty: 4 TABLET | Refills: 0 | Status: SHIPPED | OUTPATIENT
Start: 2023-06-12 | End: 2023-08-28

## 2023-06-12 ASSESSMENT — ENCOUNTER SYMPTOMS
JOINT SWELLING: 0
SHORTNESS OF BREATH: 0
HEARTBURN: 0
CHILLS: 0
PARESTHESIAS: 0
PALPITATIONS: 0
FEVER: 0
CONSTIPATION: 0
HEADACHES: 0
COUGH: 0
DYSURIA: 0
SORE THROAT: 0
WEAKNESS: 0
HEMATURIA: 0
MYALGIAS: 0
DIZZINESS: 0
NAUSEA: 0
HEMATOCHEZIA: 0
NERVOUS/ANXIOUS: 0
EYE PAIN: 0
FREQUENCY: 0
ABDOMINAL PAIN: 0
BREAST MASS: 0
ARTHRALGIAS: 0
DIARRHEA: 0

## 2023-06-12 ASSESSMENT — PAIN SCALES - GENERAL: PAINLEVEL: NO PAIN (0)

## 2023-06-12 NOTE — PATIENT INSTRUCTIONS
Plan:  1.  Labs today - [address]   2. Mammogram and colonoscopy - already scheduled  3. Next appointment for ANNUAL exam -- in 2-3 years depending on tests results

## 2023-06-12 NOTE — PROGRESS NOTES
Dr Bauer's note    Patient's instructions / PLAN:                                                        Plan:  1.  Labs today - [address]   2. Mammogram and colonoscopy - already scheduled  3. Next appointment for ANNUAL exam -- in 2-3 years depending on tests results          ASSESSMENT & PLAN:                                                      (Z00.00) Routine general medical examination at a health care facility  (primary encounter diagnosis)  Comment:   Plan: Comprehensive metabolic panel, Lipid panel         reflex to direct LDL Fasting, CBC with         platelets, TSH with free T4 reflex            (Z12.11) Screen for colon cancer  Comment:   Plan:     (Z11.59) Need for hepatitis C screening test  Comment:   Plan: Hepatitis C Screen Reflex to HCV RNA Quant and         Genotype            (Z12.4) Cervical cancer screening  Comment:   Plan:        Chief Complaint:                                                        Annual exam  Follow up chronic medical problems      SUBJECTIVE:                                                    History of present illness     We reviewed the chronic medical problems as above.   I reviewed the recent tests results in Epic       ROS:     See below     PMHx: - reviewed  Past Medical History:   Diagnosis Date     Cervical high risk HPV (human papillomavirus) test positive 04/12/2019    see problem list     Renal disease     stent in Ureter 05 for torsion       PSHx: reviewed  Past Surgical History:   Procedure Laterality Date     AS ENLARGE BREAST WITH IMPLANT  2003     GENITOURINARY SURGERY      ureteral stent 05     HERNIORRHAPHY UMBILICAL N/A 2/9/2018    Procedure: HERNIORRHAPHY UMBILICAL;  Open repair umbilical hernia with mesh;  Surgeon: Trent Perez MD;  Location:  OR     [facility] NONSPECIFIC PROCEDURE  2003    breast augmentation        Soc Hx: No daily alcohol, no smoking  Social History     Socioeconomic History     Marital status:      Spouse name:  "Not on file     Number of children: Not on file     Years of education: Not on file     Highest education level: Not on file   Occupational History     Not on file   Tobacco Use     Smoking status: Never     Passive exposure: Never     Smokeless tobacco: Never     Tobacco comments:     NEVER SMOKES ON REGULAR BASIS   Vaping Use     Vaping status: Never Used     Passive vaping exposure: Yes   Substance and Sexual Activity     Alcohol use: No     Comment: RARELY     Drug use: No     Sexual activity: Yes     Partners: Male     Birth control/protection: Pill     Comment: ZOVIA   Other Topics Concern     Parent/sibling w/ CABG, MI or angioplasty before 65F 55M? Not Asked   Social History Narrative     Not on file     Social Determinants of Health     Financial Resource Strain: Not on file   Food Insecurity: Not on file   Transportation Needs: Not on file   Physical Activity: Not on file   Stress: Not on file   Social Connections: Not on file   Intimate Partner Violence: Not on file   Housing Stability: Not on file        Fam Hx: reviewed  Family History   Problem Relation Age of Onset     Hypertension Maternal Grandmother      Arthritis Maternal Grandmother         RA     Cancer - colorectal Maternal Grandfather          Screening: reviewed      All: reviewed    Meds: reviewed  No current outpatient medications on file.       OBJECTIVE:                                                    Physical Exam :  Blood pressure 106/70, pulse 92, temperature (!) 96.2  F (35.7  C), temperature source Tympanic, resp. rate 18, height 1.753 m (5' 9\"), weight 82.2 kg (181 lb 3.2 oz), SpO2 99 %, not currently breastfeeding.     NAD, appears comfortable  Skin clear, no rashes  Neck: supple, no JVD,  no thyroidmegaly  Lymph nodes non palpable in the cervical, supraclavicular axillaries,   Chest: clear to auscultation with good respiratory effort  Cardiac: S1S2, RRR, no mgr appreciated  Abdomen: soft, not tender, not distended, audible " bowel sound, no hepatosplenomegaly, no palpable masses, no abdominal bruits  Extremities: no cyanosis, clubbing or edema.   Neuro: A, Ox3, no focal signs.  Breast exam in supine and erect position: they are symmetrical, no skin changes, no tenderness or nodes on palpation. Nipples are erect, no skin lesions, no discharge on pressure.    Pelvic exam: Normal external genitals, normal appearing perineum, normal appearing urethra,  vaginal mucosa pink, no discharge, Cervix appears normal, Pap smear obtained. On bimanual exam, I did not feel any uterus or ovarian masses, and she denies any tenderness.         Orin Bauer MD  Internal Medicine          SUBJECTIVE:   CC: Laney is an 47 year old who presents for preventive health visit.       6/12/2023     8:45 AM   Additional Questions   Roomed by Radha Dunlap     Healthy Habits:     Getting at least 3 servings of Calcium per day:  Yes    Bi-annual eye exam:  Yes    Dental care twice a year:  Yes    Sleep apnea or symptoms of sleep apnea:  None    Diet:  Regular (no restrictions)    Frequency of exercise:  2-3 days/week    Duration of exercise:  15-30 minutes    Taking medications regularly:  Not Applicable    Medication side effects:  Not applicable    PHQ-2 Total Score: 0    Additional concerns today:  No        Today's PHQ-2 Score:       6/12/2023     8:46 AM   PHQ-2 ( 1999 Pfizer)   Q1: Little interest or pleasure in doing things 0   Q2: Feeling down, depressed or hopeless 0   PHQ-2 Score 0           Social History     Tobacco Use     Smoking status: Never     Passive exposure: Never     Smokeless tobacco: Never     Tobacco comments:     NEVER SMOKES ON REGULAR BASIS   Vaping Use     Vaping status: Never Used     Passive vaping exposure: Yes   Substance Use Topics     Alcohol use: No     Comment: RARELY             6/5/2023     9:28 AM   Alcohol Use   Prescreen: >3 drinks/day or >7 drinks/week? No     Reviewed orders with patient.  Reviewed health maintenance and  updated orders accordingly - Yes  Labs reviewed in EPIC    Breast Cancer Screenin/5/2023     9:29 AM   Breast CA Risk Assessment (FHS-7)   Do you have a family history of breast, colon, or ovarian cancer? No / Unknown           Pertinent mammograms are reviewed under the imaging tab.    History of abnormal Pap smear:       Latest Ref Rng & Units 2020     9:35 AM 2020     7:35 AM 2019     9:53 AM   PAP / HPV   PAP (Historical)  NIL    NIL     HPV 16 DNA NEG^Negative  Negative      HPV 18 DNA NEG^Negative  Negative      Other HR HPV NEG^Negative  Negative        Reviewed and updated as needed this visit by clinical staff   Tobacco  Allergies  Meds   Med Hx  Surg Hx  Fam Hx          Reviewed and updated as needed this visit by Provider                     Review of Systems   Constitutional: Negative for chills and fever.   HENT: Negative for congestion, ear pain, hearing loss and sore throat.    Eyes: Negative for pain and visual disturbance.   Respiratory: Negative for cough and shortness of breath.    Cardiovascular: Negative for chest pain, palpitations and peripheral edema.   Gastrointestinal: Negative for abdominal pain, constipation, diarrhea, heartburn, hematochezia and nausea.   Breasts:  Negative for tenderness, breast mass and discharge.   Genitourinary: Negative for dysuria, frequency, genital sores, hematuria, pelvic pain, urgency, vaginal bleeding and vaginal discharge.   Musculoskeletal: Negative for arthralgias, joint swelling and myalgias.   Skin: Negative for rash.   Neurological: Negative for dizziness, weakness, headaches and paresthesias.   Psychiatric/Behavioral: Negative for mood changes. The patient is not nervous/anxious.      Patient has been advised of split billing requirements and indicates understanding: Yes At the check in, at the        COUNSELING:  Reviewed preventive health counseling, as reflected in patient instructions       Regular  exercise       Healthy diet/nutrition        She reports that she has never smoked. She has never been exposed to tobacco smoke. She has never used smokeless tobacco.          Orin Cabrera MD  [facility]

## 2023-06-13 LAB — HCV AB SERPL QL IA: NONREACTIVE

## 2023-06-15 LAB
BKR LAB AP GYN ADEQUACY: ABNORMAL
BKR LAB AP GYN INTERPRETATION: ABNORMAL
BKR LAB AP GYN OTHER FINDINGS: ABNORMAL
BKR LAB AP HPV REFLEX: ABNORMAL
BKR LAB AP PREVIOUS ABNORMAL: ABNORMAL
PATH REPORT.COMMENTS IMP SPEC: ABNORMAL
PATH REPORT.COMMENTS IMP SPEC: ABNORMAL
PATH REPORT.RELEVANT HX SPEC: ABNORMAL

## 2023-06-16 ENCOUNTER — HOSPITAL ENCOUNTER (OUTPATIENT)
Facility: CLINIC | Age: 48
Discharge: HOME | End: 2023-06-16
Attending: INTERNAL MEDICINE | Admitting: INTERNAL MEDICINE
Payer: COMMERCIAL

## 2023-06-16 VITALS
HEIGHT: 69 IN | OXYGEN SATURATION: 98 % | DIASTOLIC BLOOD PRESSURE: 82 MMHG | SYSTOLIC BLOOD PRESSURE: 114 MMHG | HEART RATE: 70 BPM | TEMPERATURE: 96.7 F | WEIGHT: 175 LBS | RESPIRATION RATE: 16 BRPM | BODY MASS INDEX: 25.92 KG/M2

## 2023-06-16 DIAGNOSIS — Z12.11 SPECIAL SCREENING FOR MALIGNANT NEOPLASMS, COLON: Primary | ICD-10-CM

## 2023-06-16 LAB
COLONOSCOPY: NORMAL
HUMAN PAPILLOMA VIRUS 16 DNA: NEGATIVE
HUMAN PAPILLOMA VIRUS 18 DNA: NEGATIVE
HUMAN PAPILLOMA VIRUS FINAL DIAGNOSIS: NORMAL
HUMAN PAPILLOMA VIRUS OTHER HR: NEGATIVE

## 2023-06-16 PROCEDURE — G0121 COLON CA SCRN NOT HI RSK IND: HCPCS | Performed by: INTERNAL MEDICINE

## 2023-06-16 PROCEDURE — 45378 DIAGNOSTIC COLONOSCOPY: CPT | Performed by: INTERNAL MEDICINE

## 2023-06-16 PROCEDURE — G0500 MOD SEDAT ENDO SERVICE >5YRS: HCPCS | Performed by: INTERNAL MEDICINE

## 2023-06-16 PROCEDURE — 250N000011 HC RX IP 250 OP 636: Performed by: INTERNAL MEDICINE

## 2023-06-16 RX ORDER — NALOXONE HYDROCHLORIDE 0.4 MG/ML
0.4 INJECTION, SOLUTION INTRAMUSCULAR; INTRAVENOUS; SUBCUTANEOUS
Status: DISCONTINUED | OUTPATIENT
Start: 2023-06-16 | End: 2023-06-16 | Stop reason: HOSPADM

## 2023-06-16 RX ORDER — ONDANSETRON 4 MG/1
4 TABLET, ORALLY DISINTEGRATING ORAL EVERY 6 HOURS PRN
Status: DISCONTINUED | OUTPATIENT
Start: 2023-06-16 | End: 2023-06-16 | Stop reason: HOSPADM

## 2023-06-16 RX ORDER — NALOXONE HYDROCHLORIDE 0.4 MG/ML
0.2 INJECTION, SOLUTION INTRAMUSCULAR; INTRAVENOUS; SUBCUTANEOUS
Status: DISCONTINUED | OUTPATIENT
Start: 2023-06-16 | End: 2023-06-16 | Stop reason: HOSPADM

## 2023-06-16 RX ORDER — FLUMAZENIL 0.1 MG/ML
0.2 INJECTION, SOLUTION INTRAVENOUS
Status: DISCONTINUED | OUTPATIENT
Start: 2023-06-16 | End: 2023-06-16 | Stop reason: HOSPADM

## 2023-06-16 RX ORDER — SIMETHICONE 40MG/0.6ML
133 SUSPENSION, DROPS(FINAL DOSAGE FORM)(ML) ORAL
Status: DISCONTINUED | OUTPATIENT
Start: 2023-06-16 | End: 2023-06-16 | Stop reason: HOSPADM

## 2023-06-16 RX ORDER — ATROPINE SULFATE 0.1 MG/ML
1 INJECTION INTRAVENOUS
Status: DISCONTINUED | OUTPATIENT
Start: 2023-06-16 | End: 2023-06-16 | Stop reason: HOSPADM

## 2023-06-16 RX ORDER — ONDANSETRON 2 MG/ML
4 INJECTION INTRAMUSCULAR; INTRAVENOUS EVERY 6 HOURS PRN
Status: DISCONTINUED | OUTPATIENT
Start: 2023-06-16 | End: 2023-06-16 | Stop reason: HOSPADM

## 2023-06-16 RX ORDER — ONDANSETRON 2 MG/ML
4 INJECTION INTRAMUSCULAR; INTRAVENOUS
Status: DISCONTINUED | OUTPATIENT
Start: 2023-06-16 | End: 2023-06-16 | Stop reason: HOSPADM

## 2023-06-16 RX ORDER — EPINEPHRINE 1 MG/ML
0.1 INJECTION, SOLUTION INTRAMUSCULAR; SUBCUTANEOUS
Status: DISCONTINUED | OUTPATIENT
Start: 2023-06-16 | End: 2023-06-16 | Stop reason: HOSPADM

## 2023-06-16 RX ORDER — PROCHLORPERAZINE MALEATE 10 MG
10 TABLET ORAL EVERY 6 HOURS PRN
Status: DISCONTINUED | OUTPATIENT
Start: 2023-06-16 | End: 2023-06-16 | Stop reason: HOSPADM

## 2023-06-16 RX ORDER — LIDOCAINE 40 MG/G
CREAM TOPICAL
Status: DISCONTINUED | OUTPATIENT
Start: 2023-06-16 | End: 2023-06-16 | Stop reason: HOSPADM

## 2023-06-16 RX ORDER — FENTANYL CITRATE 50 UG/ML
50-100 INJECTION, SOLUTION INTRAMUSCULAR; INTRAVENOUS EVERY 5 MIN PRN
Status: DISCONTINUED | OUTPATIENT
Start: 2023-06-16 | End: 2023-06-16 | Stop reason: HOSPADM

## 2023-06-16 RX ORDER — DIPHENHYDRAMINE HYDROCHLORIDE 50 MG/ML
25-50 INJECTION INTRAMUSCULAR; INTRAVENOUS
Status: DISCONTINUED | OUTPATIENT
Start: 2023-06-16 | End: 2023-06-16 | Stop reason: HOSPADM

## 2023-06-16 RX ADMIN — MIDAZOLAM 2 MG: 1 INJECTION INTRAMUSCULAR; INTRAVENOUS at 10:28

## 2023-06-16 RX ADMIN — FENTANYL CITRATE 50 MCG: 50 INJECTION, SOLUTION INTRAMUSCULAR; INTRAVENOUS at 10:33

## 2023-06-16 RX ADMIN — MIDAZOLAM 1 MG: 1 INJECTION INTRAMUSCULAR; INTRAVENOUS at 10:33

## 2023-06-16 RX ADMIN — FENTANYL CITRATE 50 MCG: 50 INJECTION, SOLUTION INTRAMUSCULAR; INTRAVENOUS at 10:36

## 2023-06-16 RX ADMIN — MIDAZOLAM 1 MG: 1 INJECTION INTRAMUSCULAR; INTRAVENOUS at 10:36

## 2023-06-16 RX ADMIN — FENTANYL CITRATE 100 MCG: 50 INJECTION, SOLUTION INTRAMUSCULAR; INTRAVENOUS at 10:28

## 2023-06-16 ASSESSMENT — ACTIVITIES OF DAILY LIVING (ADL): ADLS_ACUITY_SCORE: 37

## 2023-06-16 NOTE — H&P
Pre-Endoscopy History and Physical     Laney Hall MRN# [id number]   YOB: 1975 Age: 47 year old     Date of Procedure: 6/16/2023  Primary care provider: Orin Cabrera  Type of Endoscopy: Colonoscopy with possible biopsy, possible polypectomy  Reason for Procedure: screen  Type of Anesthesia Anticipated: Conscious Sedation    HPI:    Laney is a 47 year old female who will be undergoing the above procedure.      A history and physical has been performed. The patient's medications and allergies have been reviewed. The risks and benefits of the procedure and the sedation options and risks were discussed with the patient.  All questions were answered and informed consent was obtained.      She denies a personal or family history of anesthesia complications or bleeding disorders.     Patient Active Problem List   Diagnosis     Cervical high risk HPV (human papillomavirus) test positive        Past Medical History:   Diagnosis Date     Cervical high risk HPV (human papillomavirus) test positive 04/12/2019    see problem list     Renal disease     stent in Ureter 05 for torsion        Past Surgical History:   Procedure Laterality Date     AS ENLARGE BREAST WITH IMPLANT  2003     GENITOURINARY SURGERY      ureteral stent 05     HERNIORRHAPHY UMBILICAL N/A 2/9/2018    Procedure: HERNIORRHAPHY UMBILICAL;  Open repair umbilical hernia with mesh;  Surgeon: Trent Perez MD;  Location:  OR     [facility] NONSPECIFIC PROCEDURE  2003    breast augmentation       Social History     Tobacco Use     Smoking status: Never     Passive exposure: Never     Smokeless tobacco: Never     Tobacco comments:     NEVER SMOKES ON REGULAR BASIS   Vaping Use     Vaping status: Never Used     Passive vaping exposure: Yes   Substance Use Topics     Alcohol use: No     Comment: RARELY       Family History   Problem Relation Age of Onset     Hypertension Maternal Grandmother      Arthritis Maternal Grandmother       "   RA     Cancer - colorectal Maternal Grandfather        Prior to Admission medications    Medication Sig Start Date End Date Taking? Authorizing Provider   bisacodyl (DULCOLAX) 5 MG EC tablet Take 2 tablets at 3 pm the day before your procedure. If your procedure is before 11 am, take 2 additional tablets at 11 pm. If your procedure is after 11 am, take 2 additional tablets at 6 am. For additional instructions refer to your colonoscopy prep instructions. 6/12/23  Yes Kirk Estrada MD   polyethylene glycol (GOLYTELY) 236 g suspension The night before the exam at 6 pm drink an 8-ounce glass every 15 minutes until the jug is half empty. If you arrive before 11 AM: Drink the other half of the Golytely jug at 11 PM night before procedure. If you arrive after 11 AM: Drink the other half of the Golytely jug at 6 AM day of procedure. For additional instructions refer to your colonoscopy prep instructions. 6/12/23  Yes Kirk Estrada MD       Allergies   Allergen Reactions     No Known Drug Allergy         REVIEW OF SYSTEMS:   5 point ROS negative except as noted above in HPI, including Gen., Resp., CV, GI &  system review.    PHYSICAL EXAM:   There were no vitals taken for this visit. Estimated body mass index is 26.76 kg/m  as calculated from the following:    Height as of 6/12/23: 1.753 m (5' 9\").    Weight as of 6/12/23: 82.2 kg (181 lb 3.2 oz).   GENERAL APPEARANCE: alert, and oriented  MENTAL STATUS: alert  AIRWAY EXAM: Mallampatti Class I (visualization of the soft palate, fauces, uvula, anterior and posterior pillars)  RESP: lungs clear to auscultation - no rales, rhonchi or wheezes  CV: regular rates and rhythm  DIAGNOSTICS:    Not indicated    IMPRESSION   ASA Class 2 - Mild systemic disease    PLAN:   Plan for Colonoscopy with possible biopsy, possible polypectomy. We discussed the risks, benefits and alternatives and the patient wished to proceed.    The above has been forwarded to the consulting " provider.      Signed Electronically by: Kirk Estrada MD  June 16, 2023

## 2023-06-25 ENCOUNTER — MYC MEDICAL ADVICE (OUTPATIENT)
Dept: INTERNAL MEDICINE | Facility: CLINIC | Age: 48
End: 2023-06-25
Payer: COMMERCIAL

## 2023-06-25 DIAGNOSIS — R87.619 ENDOMETRIAL CELLS ON CERVICAL PAP SMEAR INCONSISTENT W/LMP: Primary | ICD-10-CM

## 2023-06-26 ENCOUNTER — PATIENT OUTREACH (OUTPATIENT)
Dept: INTERNAL MEDICINE | Facility: CLINIC | Age: 48
End: 2023-06-26
Payer: COMMERCIAL

## 2023-06-26 DIAGNOSIS — R87.810 CERVICAL HIGH RISK HPV (HUMAN PAPILLOMAVIRUS) TEST POSITIVE: ICD-10-CM

## 2023-06-26 NOTE — TELEPHONE ENCOUNTER
6/12/23 ASCUS pap with endometrial cells present. Neg HPV. Plan pelvic U/S and see GYN per provider  6/25/23 Provider sent pt results and message via [employer].

## 2023-06-28 NOTE — TELEPHONE ENCOUNTER
Called patient and gave her results from her recent pap smear. I advised patient per Dr. Bauer to schedule a visit with OBGYN and pelvic ultrasound.

## 2023-06-29 NOTE — TELEPHONE ENCOUNTER
6/12/23 ASCUS pap with endometrial cells present. Neg HPV. Plan pelvic U/S and see GYN per pro  vider  6/25/23 Provider sent pt results and message via [employer].  7/11/23 Pelvic U/S  8/28/23 Appt

## 2023-07-11 ENCOUNTER — HOSPITAL ENCOUNTER (OUTPATIENT)
Dept: ULTRASOUND IMAGING | Facility: CLINIC | Age: 48
Discharge: HOME | End: 2023-07-11
Attending: INTERNAL MEDICINE | Admitting: INTERNAL MEDICINE
Payer: COMMERCIAL

## 2023-07-11 DIAGNOSIS — R87.619 ENDOMETRIAL CELLS ON CERVICAL PAP SMEAR INCONSISTENT W/LMP: ICD-10-CM

## 2023-07-11 PROCEDURE — 76830 TRANSVAGINAL US NON-OB: CPT

## 2023-08-28 ENCOUNTER — OFFICE VISIT (OUTPATIENT)
Dept: OBGYN | Facility: CLINIC | Age: 48
End: 2023-08-28
Payer: COMMERCIAL

## 2023-08-28 VITALS
SYSTOLIC BLOOD PRESSURE: 110 MMHG | WEIGHT: 181.1 LBS | DIASTOLIC BLOOD PRESSURE: 70 MMHG | BODY MASS INDEX: 26.82 KG/M2 | HEIGHT: 69 IN

## 2023-08-28 DIAGNOSIS — N92.0 MENORRHAGIA WITH REGULAR CYCLE: Primary | ICD-10-CM

## 2023-08-28 DIAGNOSIS — R87.619 ENDOMETRIAL CELLS ON CERVICAL PAP SMEAR INCONSISTENT W/LMP: ICD-10-CM

## 2023-08-28 PROCEDURE — 99204 OFFICE O/P NEW MOD 45 MIN: CPT | Performed by: FAMILY MEDICINE

## 2023-08-28 PROCEDURE — 88305 TISSUE EXAM BY PATHOLOGIST: CPT | Performed by: PATHOLOGY

## 2023-08-28 NOTE — PROGRESS NOTES
SUBJECTIVE:  Laney Hall is an 47 year old   woman who presents for   gynecology consult for endometrial cells on pap smear, while not on period.   Us showing endometrial thickening (was on period during us).  .      Patient's last menstrual period was 2023 (exact date). Periods are regular q 28-30 days, lasting   5 days. Menarche @ age teenager, Dysmenorrhea:none. Cyclic symptoms   include none. No intermenstrual bleeding,   spotting, or discharge.    Current contraception: tubal ligation  History of abnormal Pap smear: Yes  Family history of uterine or ovarian cancer: No  History of abnormal mammogram: No  Family history of breast cancer: No        Past Medical History:   Diagnosis Date    Cervical high risk HPV (human papillomavirus) test positive 2019    see problem list    Renal disease     stent in Ureter 05 for torsion          Family History   Problem Relation Age of Onset    Hypertension Maternal Grandmother     Arthritis Maternal Grandmother         RA    Cancer - colorectal Maternal Grandfather        Past Surgical History:   Procedure Laterality Date    AS ENLARGE BREAST WITH IMPLANT      COLONOSCOPY N/A 2023    Procedure: Colonoscopy;  Surgeon: Kirk Estrada MD;  Location:  GI    GENITOURINARY SURGERY      ureteral stent 05    HERNIORRHAPHY UMBILICAL N/A 2018    Procedure: HERNIORRHAPHY UMBILICAL;  Open repair umbilical hernia with mesh;  Surgeon: Trent Perez MD;  Location:  OR    [facility] NONSPECIFIC PROCEDURE      breast augmentation       No current outpatient medications on file.     No current facility-administered medications for this visit.     Allergies   Allergen Reactions    No Known Drug Allergy        Social History     Tobacco Use    Smoking status: Never     Passive exposure: Never    Smokeless tobacco: Never    Tobacco comments:     NEVER SMOKES ON REGULAR BASIS   Substance Use Topics    Alcohol use: No     Comment: RARELY       Review  "Of Systems  Ears/Nose/Throat: negative  Respiratory: No shortness of breath, dyspnea on exertion, cough, or hemoptysis  Cardiovascular: negative  Gastrointestinal: negative  Genitourinary: See HPI   Constitutional, HEENT, cardiovascular, pulmonary, GI, , musculoskeletal, neuro, skin, endocrine and psych systems are negative, except as otherwise noted.    OBJECTIVE:  /70   Ht 1.753 m (5' 9\")   Wt 82.1 kg (181 lb 1.6 oz)   LMP 08/21/2023 (Exact Date)   BMI 26.74 kg/m    General appearance: healthy, alert, and no distress  Skin: Skin color, texture, turgor normal. No rashes or lesions.  Ears: negative  Nose/Sinuses: Nares normal. Septum midline. Mucosa normal. No drainage or sinus tenderness.  Oropharynx: Lips, mucosa, and tongue normal. Teeth and gums normal.  Neck: Neck supple. No adenopathy. Thyroid symmetric, normal size,, Carotids without bruits.  Lungs: negative, Percussion normal. Good diaphragmatic excursion. Lungs clear  Heart: negative, PMI normal. No lifts, heaves, or thrills. RRR. No murmurs, clicks gallops or rub  Breasts: deferred  Abdomen: Abdomen soft, non-tender. BS normal. No masses, organomegaly  Pelvic: Pelvic:  Pelvic examination with no pap/no Gonorrhea and Chlamydia   including  External genitalia normal   and vagina normal rugatted not atrophic  Examination of urethra   normal no masses, tenderness, scarring  bladder, no masses or tenderness  Cervix no lesions or discharge  Bimanual exam with   Uterus 9 weeks size, mid position, mobile,no-tenderness, normal no descent   Adnexa/parametria  normal no masses     Procedure:  Endometrial biopsy    Indication: Menometrorrhagia with age >35                   Endometrial cells on pap and age >40    Discussed risk of bleeding, infection, uterine perforation, cramping pain.  Pt agreed to proceed with procedure after all questions answered. INFORMED CONSENT OBTAINED   TIME OUT DONE PRIOR TO PROCEDURE:   STATING PROCEDURE NAME AND NEGATIVE URINE " PREGNANCY TEST    Speculum placed and cervix visualized.  Cervix cleansed with betadine x 3.  Tenaculum placed on anterior lip of the cervix.  Endometrial biopsy pipelle passed through cervix and uterus sounded to 9 cm.  Biopsy specimen collected with one pass with return of moderate amount of pink tissue.  Specimen placed in a labeled container and set aside to be sent to pathology.  Tenaculum removed from the cervix and sites hemostatic.  No bleeding noted from cervical os.     Patient tolerated the procedure well.  There were no apparent complications and bleeding was minimal.    She is instructed to use no tampons and have no intercourse for the next 5 days.        ASSESSMENT:  Laney Hall is an 47 year old   woman who presents for   gynecology consult for endometrial cells on pap smear, while not on period.   Us showing endometrial thickening (was on period during us).    PLAN:  Dx:  1)   Menorrhagia: Discussed possible treatment options includin OCP's, Nuvaring, patch, Mirena IUD, lysteda, Novasure endometrial ablation and hysterectomy.  Reviewed pelvic ultrasound and endometrial sampling, discussed endometrial biopsy and dilation and curettage.       Desires endometrial biopsy today   Declines treatment of her menorrhagia.       Labs were reviewed in [facility]   Imaging was reviewed in Epic   Tests and documents were reviewed.   Discussion of management or test interpretation     Dr. Nicole Yepez, DO    Obstetrics and Gynecology  [facility] - [place] and [place]

## 2023-08-28 NOTE — NURSING NOTE
"Chief Complaint   Patient presents with    Consult     Pap 23 showed endometrial cells--had pelvic US 23       Initial /70   Ht 1.753 m (5' 9\")   Wt 82.1 kg (181 lb 1.6 oz)   LMP 2023 (Exact Date)   BMI 26.74 kg/m   Estimated body mass index is 26.74 kg/m  as calculated from the following:    Height as of this encounter: 1.753 m (5' 9\").    Weight as of this encounter: 82.1 kg (181 lb 1.6 oz).  BP completed using cuff size: regular    Questioned patient about current smoking habits.  Pt. has never smoked.          The following HM Due: NONE    "

## 2023-08-28 NOTE — PATIENT INSTRUCTIONS
Consider lysteda for decreasing the amount of the period      Dr. Nicole Yepez, DO    Obstetrics and Gynecology  [facility] and [place]

## 2023-08-29 LAB
PATH REPORT.COMMENTS IMP SPEC: NORMAL
PATH REPORT.COMMENTS IMP SPEC: NORMAL
PATH REPORT.FINAL DX SPEC: NORMAL
PATH REPORT.GROSS SPEC: NORMAL
PATH REPORT.MICROSCOPIC SPEC OTHER STN: NORMAL
PATH REPORT.RELEVANT HX SPEC: NORMAL
PHOTO IMAGE: NORMAL

## 2024-05-13 ENCOUNTER — PATIENT OUTREACH (OUTPATIENT)
Dept: CARE COORDINATION | Facility: CLINIC | Age: 49
End: 2024-05-13
Payer: COMMERCIAL

## 2024-05-27 ENCOUNTER — PATIENT OUTREACH (OUTPATIENT)
Dept: CARE COORDINATION | Facility: CLINIC | Age: 49
End: 2024-05-27
Payer: COMMERCIAL

## 2024-06-04 ENCOUNTER — PATIENT OUTREACH (OUTPATIENT)
Dept: INTERNAL MEDICINE | Facility: CLINIC | Age: 49
End: 2024-06-04
Payer: COMMERCIAL

## 2024-06-04 NOTE — TELEPHONE ENCOUNTER
Patient due for Pap and HPV.    Reminder done today via [employer].

## 2024-06-10 ENCOUNTER — PATIENT OUTREACH (OUTPATIENT)
Dept: CARE COORDINATION | Facility: CLINIC | Age: 49
End: 2024-06-10
Payer: COMMERCIAL

## 2024-08-25 ENCOUNTER — HEALTH MAINTENANCE LETTER (OUTPATIENT)
Age: 49
End: 2024-08-25

## 2024-11-06 SDOH — HEALTH STABILITY: PHYSICAL HEALTH: ON AVERAGE, HOW MANY DAYS PER WEEK DO YOU ENGAGE IN MODERATE TO STRENUOUS EXERCISE (LIKE A BRISK WALK)?: 2 DAYS

## 2024-11-06 SDOH — HEALTH STABILITY: PHYSICAL HEALTH: ON AVERAGE, HOW MANY MINUTES DO YOU ENGAGE IN EXERCISE AT THIS LEVEL?: 40 MIN

## 2024-11-06 ASSESSMENT — SOCIAL DETERMINANTS OF HEALTH (SDOH): HOW OFTEN DO YOU GET TOGETHER WITH FRIENDS OR RELATIVES?: TWICE A WEEK

## 2024-11-08 ENCOUNTER — OFFICE VISIT (OUTPATIENT)
Dept: INTERNAL MEDICINE | Facility: CLINIC | Age: 49
End: 2024-11-08
Payer: COMMERCIAL

## 2024-11-08 ENCOUNTER — HOSPITAL ENCOUNTER (OUTPATIENT)
Dept: MAMMOGRAPHY | Facility: CLINIC | Age: 49
Discharge: HOME | End: 2024-11-08
Attending: INTERNAL MEDICINE | Admitting: INTERNAL MEDICINE
Payer: COMMERCIAL

## 2024-11-08 VITALS
OXYGEN SATURATION: 100 % | TEMPERATURE: 98 F | SYSTOLIC BLOOD PRESSURE: 100 MMHG | HEIGHT: 70 IN | DIASTOLIC BLOOD PRESSURE: 72 MMHG | WEIGHT: 177 LBS | BODY MASS INDEX: 25.34 KG/M2 | RESPIRATION RATE: 16 BRPM | HEART RATE: 63 BPM

## 2024-11-08 DIAGNOSIS — Z00.00 ROUTINE GENERAL MEDICAL EXAMINATION AT A HEALTH CARE FACILITY: Primary | ICD-10-CM

## 2024-11-08 DIAGNOSIS — Z87.42 HISTORY OF ABNORMAL CERVICAL PAP SMEAR: ICD-10-CM

## 2024-11-08 DIAGNOSIS — R73.9 BLOOD SUGAR INCREASED: ICD-10-CM

## 2024-11-08 DIAGNOSIS — Z12.4 CERVICAL CANCER SCREENING: ICD-10-CM

## 2024-11-08 DIAGNOSIS — Z12.31 VISIT FOR SCREENING MAMMOGRAM: ICD-10-CM

## 2024-11-08 LAB
ALBUMIN SERPL BCG-MCNC: 4 G/DL (ref 3.5–5.2)
ALP SERPL-CCNC: 68 U/L (ref 40–150)
ALT SERPL W P-5'-P-CCNC: 12 U/L (ref 0–50)
ANION GAP SERPL CALCULATED.3IONS-SCNC: 11 MMOL/L (ref 7–15)
AST SERPL W P-5'-P-CCNC: 18 U/L (ref 0–45)
BILIRUB SERPL-MCNC: 0.4 MG/DL
BUN SERPL-MCNC: 10.7 MG/DL (ref 6–20)
CALCIUM SERPL-MCNC: 9.1 MG/DL (ref 8.8–10.4)
CHLORIDE SERPL-SCNC: 106 MMOL/L (ref 98–107)
CHOLEST SERPL-MCNC: 200 MG/DL
CREAT SERPL-MCNC: 0.91 MG/DL (ref 0.51–0.95)
EGFRCR SERPLBLD CKD-EPI 2021: 77 ML/MIN/1.73M2
ERYTHROCYTE [DISTWIDTH] IN BLOOD BY AUTOMATED COUNT: 12.8 % (ref 10–15)
EST. AVERAGE GLUCOSE BLD GHB EST-MCNC: 94 MG/DL
FASTING STATUS PATIENT QL REPORTED: YES
FASTING STATUS PATIENT QL REPORTED: YES
GLUCOSE SERPL-MCNC: 98 MG/DL (ref 70–99)
HBA1C MFR BLD: 4.9 % (ref 0–5.6)
HCO3 SERPL-SCNC: 24 MMOL/L (ref 22–29)
HCT VFR BLD AUTO: 41.2 % (ref 35–47)
HDLC SERPL-MCNC: 69 MG/DL
HGB BLD-MCNC: 13.7 G/DL (ref 11.7–15.7)
LDLC SERPL CALC-MCNC: 115 MG/DL
MCH RBC QN AUTO: 30.9 PG (ref 26.5–33)
MCHC RBC AUTO-ENTMCNC: 33.3 G/DL (ref 31.5–36.5)
MCV RBC AUTO: 93 FL (ref 78–100)
NONHDLC SERPL-MCNC: 131 MG/DL
PLATELET # BLD AUTO: 237 10E3/UL (ref 150–450)
POTASSIUM SERPL-SCNC: 4.1 MMOL/L (ref 3.4–5.3)
PROT SERPL-MCNC: 6.6 G/DL (ref 6.4–8.3)
RBC # BLD AUTO: 4.44 10E6/UL (ref 3.8–5.2)
SODIUM SERPL-SCNC: 141 MMOL/L (ref 135–145)
TRIGL SERPL-MCNC: 82 MG/DL
TSH SERPL DL<=0.005 MIU/L-ACNC: 1.77 UIU/ML (ref 0.3–4.2)
WBC # BLD AUTO: 7.3 10E3/UL (ref 4–11)

## 2024-11-08 PROCEDURE — 77063 BREAST TOMOSYNTHESIS BI: CPT

## 2024-11-08 PROCEDURE — G0145 SCR C/V CYTO,THINLAYER,RESCR: HCPCS | Performed by: INTERNAL MEDICINE

## 2024-11-08 PROCEDURE — 36415 COLL VENOUS BLD VENIPUNCTURE: CPT | Performed by: INTERNAL MEDICINE

## 2024-11-08 PROCEDURE — 83036 HEMOGLOBIN GLYCOSYLATED A1C: CPT | Performed by: INTERNAL MEDICINE

## 2024-11-08 PROCEDURE — 84443 ASSAY THYROID STIM HORMONE: CPT | Performed by: INTERNAL MEDICINE

## 2024-11-08 PROCEDURE — 87624 HPV HI-RISK TYP POOLED RSLT: CPT | Performed by: INTERNAL MEDICINE

## 2024-11-08 PROCEDURE — 80053 COMPREHEN METABOLIC PANEL: CPT | Performed by: INTERNAL MEDICINE

## 2024-11-08 PROCEDURE — 99396 PREV VISIT EST AGE 40-64: CPT | Performed by: INTERNAL MEDICINE

## 2024-11-08 PROCEDURE — 85027 COMPLETE CBC AUTOMATED: CPT | Performed by: INTERNAL MEDICINE

## 2024-11-08 PROCEDURE — 80061 LIPID PANEL: CPT | Performed by: INTERNAL MEDICINE

## 2024-11-08 NOTE — PROGRESS NOTES
Dr Bauer's note    Patient's instructions / PLAN:                                                        Plan:   Labs today - [address]   2. Tetanus shot - due Nov 2025          ASSESSMENT & PLAN:                                                        (Z00.00) Routine general medical examination at a health care facility  Comment:   Plan: CBC with platelets, Comprehensive metabolic         panel, Lipid panel reflex to direct LDL         Fasting, TSH with free T4 reflex, Hemoglobin         A1c            (R73.9) Blood sugar increased  Comment:   Plan: Hemoglobin A1c            (Z87.42) History of abnormal cervical Pap smear  Comment:   Plan: Pap Diagnostic Reflex to HPV if ASCUS             (Z12.4) Cervical cancer screening  (primary encounter diagnosis)  Comment:   Plan: Pap Diagnostic Reflex to HPV if ASCUS            Chief Complaint:                                                        Annual exam  Follow up chronic medical problems      SUBJECTIVE:                                                    History of present illness     We reviewed the chronic medical problems as above.   I reviewed the recent tests results in Epic       ROS:                                                      ROS: negative for fever, chills, cough, wheezes, chest pain, shortness of breath, vomiting, abdominal pain, leg swelling     PMHx: - reviewed  Past Medical History:   Diagnosis Date    Cervical high risk HPV (human papillomavirus) test positive 04/12/2019    see problem list    Renal disease     stent in Ureter 05 for torsion       PSHx: reviewed  Past Surgical History:   Procedure Laterality Date    AS ENLARGE BREAST WITH IMPLANT  2003    COLONOSCOPY N/A 06/16/2023    Procedure: Colonoscopy;  Surgeon: Kirk Estrada MD;  Location:  GI    COSMETIC SURGERY      GENITOURINARY SURGERY      ureteral stent 05    HERNIORRHAPHY UMBILICAL N/A 02/09/2018    Procedure: HERNIORRHAPHY UMBILICAL;  Open repair umbilical hernia with  mesh;  Surgeon: Trent Perez MD;  Location: [facility] OR    [facility] NONSPECIFIC PROCEDURE  2003    breast augmentation        Soc Hx: No daily alcohol, no smoking  Social History     Socioeconomic History    Marital status:      Spouse name: Not on file    Number of children: Not on file    Years of education: Not on file    Highest education level: Not on file   Occupational History    Not on file   Tobacco Use    Smoking status: Never     Passive exposure: Never    Smokeless tobacco: Never    Tobacco comments:     NEVER SMOKES ON REGULAR BASIS   Vaping Use    Vaping status: Never Used   Substance and Sexual Activity    Alcohol use: No     Comment: RARELY    Drug use: No    Sexual activity: Yes     Partners: Male     Birth control/protection: Male Surgical     Comment: ZOVIA   Other Topics Concern    Parent/sibling w/ CABG, MI or angioplasty before 65F 55M? No   Social History Narrative    Not on file     Social Drivers of Health     Financial Resource Strain: Low Risk  (11/6/2024)    Financial Resource Strain     Within the past 12 months, have you or your family members you live with been unable to get utilities (heat, electricity) when it was really needed?: No   Food Insecurity: Low Risk  (11/6/2024)    Food Insecurity     Within the past 12 months, did you worry that your food would run out before you got money to buy more?: No     Within the past 12 months, did the food you bought just not last and you didn t have money to get more?: No   Transportation Needs: Low Risk  (11/6/2024)    Transportation Needs     Within the past 12 months, has lack of transportation kept you from medical appointments, getting your medicines, non-medical meetings or appointments, work, or from getting things that you need?: No   Physical Activity: Insufficiently Active (11/6/2024)    Exercise Vital Sign     Days of Exercise per Week: 2 days     Minutes of Exercise per Session: 40 min   Stress: No Stress Concern Present  "(11/6/2024)    [employer] [place] [employer] - Occupational Stress Questionnaire     Feeling of Stress : Only a little   Social Connections: Unknown (11/6/2024)    Social Connection and Isolation Panel [NHANES]     Frequency of Communication with Friends and Family: Not on file     Frequency of Social Gatherings with Friends and Family: Twice a week     Attends Denominational Services: Not on file     Active Member of Clubs or Organizations: Not on file     Attends Club or Organization Meetings: Not on file     Marital Status: Not on file   Interpersonal Safety: Low Risk  (11/8/2024)    Interpersonal Safety     Do you feel physically and emotionally safe where you currently live?: Yes     Within the past 12 months, have you been hit, slapped, kicked or otherwise physically hurt by someone?: No     Within the past 12 months, have you been humiliated or emotionally abused in other ways by your partner or ex-partner?: No   Housing Stability: Low Risk  (11/6/2024)    Housing Stability     Do you have housing? : Yes     Are you worried about losing your housing?: No        Fam Hx: reviewed  Family History   Problem Relation Age of Onset    Hypertension Maternal Grandmother     Arthritis Maternal Grandmother         RA    Cancer - colorectal Maternal Grandfather          Screening: reviewed      All: reviewed    Meds: reviewed  No current outpatient medications on file.       OBJECTIVE:                                                    Physical Exam :  Blood pressure 100/72, pulse 63, temperature 98  F (36.7  C), temperature source Oral, resp. rate 16, height 1.765 m (5' 9.5\"), weight 80.3 kg (177 lb), last menstrual period 11/03/2024, SpO2 100%, not currently breastfeeding.     NAD, appears comfortable  Skin clear, no rashes  Neck: supple, no JVD,  no thyroidmegaly  Lymph nodes non palpable in the cervical, supraclavicular axillaries,   Chest: clear to auscultation with good respiratory effort  Cardiac: S1S2, RRR, " no mgr appreciated  Abdomen: soft, not tender, not distended, audible bowel sound, no hepatosplenomegaly, no palpable masses, no abdominal bruits  Extremities: no cyanosis, clubbing or edema.   Neuro: A, Ox3, no focal signs.  Breast exam in supine and erect position: they are symmetrical, no skin changes, no tenderness or nodes on palpation. Nipples are erect, no skin lesions, no discharge on pressure.    Pelvic exam: Normal external genitals, normal appearing perineum, normal appearing urethra,  vaginal mucosa pink, no discharge, Cervix appears normal, Pap smear obtained. On bimanual exam, I did not feel any uterus or ovarian masses, and she denies any tenderness.        Patient has been advised of split billing requirements and indicates understanding: Yes.  At the check in, at the      Orin Bauer MD  Internal Medicine         ###########################  Preventive Care Visit  [facility]  Orin Cabrera MD, Internal Medicine  Nov 8, 2024          Vipin Davison is a 49 year old, presenting for the following:  Physical (Fasting, pap)        11/8/2024     7:46 AM   Additional Questions   Roomed by Ayesha BACON        Via the Health Maintenance questionnaire, the patient has reported the following services have been completed -Mammogram: [facility] 2022-11-01, this information has not been sent to the abstraction team.    HPI          Health Care Directive  Patient does not have a Health Care Directive: Discussed advance care planning with patient; however, patient declined at this time.      11/6/2024   General Health   How would you rate your overall physical health? Good   Feel stress (tense, anxious, or unable to sleep) Only a little      (!) STRESS CONCERN      11/6/2024   Nutrition   Three or more servings of calcium each day? Yes   Diet: Regular (no restrictions)   How many servings of fruit and vegetables per day? (!) I DON'T KNOW   How many sweetened  beverages each day? 0-1            11/6/2024   Exercise   Days per week of moderate/strenous exercise 2 days   Average minutes spent exercising at this level 40 min      (!) EXERCISE CONCERN      11/6/2024   Social Factors   Frequency of gathering with friends or relatives Twice a week   Worry food won't last until get money to buy more No   Food not last or not have enough money for food? No   Do you have housing? (Housing is defined as stable permanent housing and does not include staying ouside in a car, in a tent, in an abandoned building, in an overnight shelter, or couch-surfing.) Yes   Are you worried about losing your housing? No   Lack of transportation? No   Unable to get utilities (heat,electricity)? No            11/6/2024   Dental   Dentist two times every year? Yes            11/6/2024   TB Screening   Were you born outside of the [place]? No            Today's PHQ-2 Score:       11/8/2024     7:40 AM   PHQ-2 ( 1999 [employer])   Q1: Little interest or pleasure in doing things 0    Q2: Feeling down, depressed or hopeless 0    PHQ-2 Score 0    Q1: Little interest or pleasure in doing things Not at all   Q2: Feeling down, depressed or hopeless Not at all   PHQ-2 Score 0       Patient-reported           11/6/2024   Substance Use   Alcohol more than 3/day or more than 7/wk No   Do you use any other substances recreationally? No        Social History     Tobacco Use    Smoking status: Never     Passive exposure: Never    Smokeless tobacco: Never    Tobacco comments:     NEVER SMOKES ON REGULAR BASIS   Vaping Use    Vaping status: Never Used   Substance Use Topics    Alcohol use: No     Comment: RARELY    Drug use: No           6/12/2023   LAST FHS-7 RESULTS   1st degree relative breast or ovarian cancer No   Any relative bilateral breast cancer No   Any male have breast cancer No   Any ONE woman have BOTH breast AND ovarian cancer No   Any woman with breast cancer before 50yrs No   2 or more relatives with breast  "AND/OR ovarian cancer No   2 or more relatives with breast AND/OR bowel cancer No                   11/6/2024   STI Screening   New sexual partner(s) since last STI/HIV test? No        History of abnormal Pap smear:         Latest Ref Rng & Units 6/12/2023    12:15 PM 7/16/2020     9:35 AM 7/16/2020     7:35 AM   PAP / HPV   PAP  Atypical squamous cells of undetermined significance (ASC-US)      PAP (Historical)   NIL     HPV 16 DNA Negative Negative   Negative    HPV 18 DNA Negative Negative   Negative    Other HR HPV Negative Negative   Negative      ASCVD Risk   The 10-year ASCVD risk score (Stephanie MON, et al., 2019) is: 0.4%    Values used to calculate the score:      Age: 49 years      Sex: Female      Is Non- : No      Diabetic: No      Tobacco smoker: No      Systolic Blood Pressure: 100 mmHg      Is BP treated: No      HDL Cholesterol: 77 mg/dL      Total Cholesterol: 198 mg/dL        11/6/2024   Contraception/Family Planning   Questions about contraception or family planning No           Reviewed and updated as needed this visit by Provider                             Objective    Exam  /72   Pulse 63   Temp 98  F (36.7  C) (Oral)   Resp 16   Ht 1.765 m (5' 9.5\")   Wt 80.3 kg (177 lb)   LMP 11/03/2024 (Approximate)   SpO2 100%   BMI 25.76 kg/m     Estimated body mass index is 25.76 kg/m  as calculated from the following:    Height as of this encounter: 1.765 m (5' 9.5\").    Weight as of this encounter: 80.3 kg (177 lb).    Physical Exam          Signed Electronically by: Orin Cabrera MD    "

## 2024-11-08 NOTE — NURSING NOTE
"Chief Complaint   Patient presents with    Physical     Fasting, pap     initial /72   Pulse 63   Temp 98  F (36.7  C) (Oral)   Resp 16   Ht 1.765 m (5' 9.5\")   Wt 80.3 kg (177 lb)   LMP 11/03/2024 (Approximate)   SpO2 100%   BMI 25.76 kg/m   Estimated body mass index is 25.76 kg/m  as calculated from the following:    Height as of this encounter: 1.765 m (5' 9.5\").    Weight as of this encounter: 80.3 kg (177 lb)..  bp completed using cuff size large  SERAFIN ESCALERA LPN  "

## 2024-11-14 LAB
BKR LAB AP GYN ADEQUACY: NORMAL
BKR LAB AP GYN INTERPRETATION: NORMAL
BKR LAB AP HPV REFLEX: NORMAL
BKR LAB AP LMP: NORMAL
BKR LAB AP PREVIOUS ABNL DX: NORMAL
BKR LAB AP PREVIOUS ABNORMAL: NORMAL
PATH REPORT.COMMENTS IMP SPEC: NORMAL
PATH REPORT.COMMENTS IMP SPEC: NORMAL
PATH REPORT.RELEVANT HX SPEC: NORMAL

## 2024-11-15 LAB
HPV HR 12 DNA CVX QL NAA+PROBE: NEGATIVE
HPV16 DNA CVX QL NAA+PROBE: NEGATIVE
HPV18 DNA CVX QL NAA+PROBE: NEGATIVE
HUMAN PAPILLOMA VIRUS FINAL DIAGNOSIS: NORMAL

## (undated) DEVICE — SU DERMABOND MINI DHVM12

## (undated) DEVICE — TUBING SUCTION 12"X1/4" N612

## (undated) DEVICE — MANIFOLD NEPTUNE 4 PORT 700-20

## (undated) DEVICE — GLOVE PROTEXIS BLUE W/NEU-THERA 8.0  2D73EB80

## (undated) DEVICE — KIT ENDO TURNOVER/PROCEDURE W/CLEAN A SCOPE LINERS 103888

## (undated) DEVICE — BAG CLEAR TRASH 1.3M 39X33" P4040C

## (undated) DEVICE — PREP CHLORAPREP 26ML TINTED ORANGE  260815

## (undated) DEVICE — PACK MINOR CUSTOM RIDGES SBA32RMRMA

## (undated) DEVICE — ESU GROUND PAD ADULT W/CORD E7507

## (undated) DEVICE — ESU ELEC BLADE 2.75" COATED/INSULATED E1455

## (undated) DEVICE — SU VICRYL 3-0 TIE 12X18" J904T

## (undated) DEVICE — LINEN TOWEL PACK X10 5473

## (undated) DEVICE — NDL 22GA 1.5"

## (undated) DEVICE — GLOVE PROTEXIS POWDER FREE 7.5 ORTHOPEDIC 2D73ET75

## (undated) DEVICE — LINEN HALF SHEET 5512

## (undated) DEVICE — SU VICRYL 4-0 PS-2 18" UND J496H

## (undated) DEVICE — SU VICRYL 3-0 PS-2 18" UND J497H

## (undated) DEVICE — SU VICRYL 3-0 SH 27" J316H

## (undated) DEVICE — SU PDS II 0 CT-2 27" Z334H

## (undated) DEVICE — DRAPE LAP W/ARMBOARD 29410

## (undated) DEVICE — LINEN FULL SHEET 5511

## (undated) RX ORDER — LIDOCAINE HYDROCHLORIDE 10 MG/ML
INJECTION, SOLUTION EPIDURAL; INFILTRATION; INTRACAUDAL; PERINEURAL
Status: DISPENSED
Start: 2018-02-09

## (undated) RX ORDER — DEXAMETHASONE SODIUM PHOSPHATE 4 MG/ML
INJECTION, SOLUTION INTRA-ARTICULAR; INTRALESIONAL; INTRAMUSCULAR; INTRAVENOUS; SOFT TISSUE
Status: DISPENSED
Start: 2018-02-09

## (undated) RX ORDER — FENTANYL CITRATE 50 UG/ML
INJECTION, SOLUTION INTRAMUSCULAR; INTRAVENOUS
Status: DISPENSED
Start: 2023-06-16

## (undated) RX ORDER — HYDROCODONE BITARTRATE AND ACETAMINOPHEN 5; 325 MG/1; MG/1
TABLET ORAL
Status: DISPENSED
Start: 2018-02-09

## (undated) RX ORDER — CEFAZOLIN SODIUM 2 G/100ML
INJECTION, SOLUTION INTRAVENOUS
Status: DISPENSED
Start: 2018-02-09

## (undated) RX ORDER — BUPIVACAINE HYDROCHLORIDE 5 MG/ML
INJECTION, SOLUTION EPIDURAL; INTRACAUDAL
Status: DISPENSED
Start: 2018-02-09

## (undated) RX ORDER — FENTANYL CITRATE 50 UG/ML
INJECTION, SOLUTION INTRAMUSCULAR; INTRAVENOUS
Status: DISPENSED
Start: 2018-02-09

## (undated) RX ORDER — PROPOFOL 10 MG/ML
INJECTION, EMULSION INTRAVENOUS
Status: DISPENSED
Start: 2018-02-09

## (undated) RX ORDER — GLYCOPYRROLATE 0.2 MG/ML
INJECTION INTRAMUSCULAR; INTRAVENOUS
Status: DISPENSED
Start: 2018-02-09

## (undated) RX ORDER — ONDANSETRON 2 MG/ML
INJECTION INTRAMUSCULAR; INTRAVENOUS
Status: DISPENSED
Start: 2018-02-09